# Patient Record
Sex: FEMALE | Race: WHITE | NOT HISPANIC OR LATINO | Employment: OTHER | URBAN - METROPOLITAN AREA
[De-identification: names, ages, dates, MRNs, and addresses within clinical notes are randomized per-mention and may not be internally consistent; named-entity substitution may affect disease eponyms.]

---

## 2024-01-02 ENCOUNTER — TELEPHONE (OUTPATIENT)
Dept: ORTHOPEDIC SURGERY | Facility: CLINIC | Age: 69
End: 2024-01-02

## 2024-01-18 ENCOUNTER — OFFICE VISIT (OUTPATIENT)
Dept: ORTHOPEDIC SURGERY | Facility: CLINIC | Age: 69
End: 2024-01-18
Payer: MEDICARE

## 2024-01-18 VITALS — WEIGHT: 217 LBS | HEIGHT: 66 IN | HEART RATE: 63 BPM | BODY MASS INDEX: 34.87 KG/M2

## 2024-01-18 DIAGNOSIS — Z47.89 ORTHOPEDIC AFTERCARE: Primary | ICD-10-CM

## 2024-01-18 PROCEDURE — 99024 POSTOP FOLLOW-UP VISIT: CPT | Performed by: ORTHOPAEDIC SURGERY

## 2024-01-18 RX ORDER — HYDROCODONE BITARTRATE AND ACETAMINOPHEN 5; 325 MG/1; MG/1
1 TABLET ORAL EVERY 6 HOURS PRN
Qty: 20 TABLET | Refills: 0 | Status: SHIPPED | OUTPATIENT
Start: 2024-01-18

## 2024-01-18 NOTE — PROGRESS NOTES
Patient ID: Ibeth Farris is a 68 y.o. female.    12/6/23 IM nail left tibia  Nonweightbearing doing therapy at home  Objective:    There were no vitals taken for this visit.    Physical Examination:    Incisions of the knee and ankle are healing well there is some granulation tissue and stable eschar is over the midshaft tibia incision no sign of infection calf is soft supple range of motion of the knee and ankle  Sensory and motor exam are intact in all distributions. Dorsalis pedis and posterior tibialis pulses are palpable and capillary refill is less than two seconds to all digits.    Imaging:    left Tib-Fib X-Ray  Indication: Postop IM nail  AP and Lateral views  Findings: Maintenance of fracture reduction of the tibia with callus progression nail in position with good healing of the periprosthetic fibular shaft fracture  no bony lesion  Soft tissues normal  normal joint spaces  Hardware appropriately positioned yes      yes prior studies available for comparison.  Assessment:  Doing well after IM nail left tibia    Plan:  Can begin 50% weightbearing February 1 begin therapy with the boot and walker see me with x-ray in 6 weeks

## 2024-01-19 ENCOUNTER — PATIENT ROUNDING (BHMG ONLY) (OUTPATIENT)
Dept: ORTHOPEDIC SURGERY | Facility: CLINIC | Age: 69
End: 2024-01-19
Payer: MEDICARE

## 2024-01-19 NOTE — PROGRESS NOTES
A My-Chart message has been sent to the patient for PATIENT ROUNDING with [facility] [place]

## 2024-01-30 ENCOUNTER — TELEPHONE (OUTPATIENT)
Dept: FAMILY MEDICINE CLINIC | Facility: CLINIC | Age: 69
End: 2024-01-30
Payer: MEDICARE

## 2024-01-30 DIAGNOSIS — I10 PRIMARY HYPERTENSION: Primary | ICD-10-CM

## 2024-01-30 DIAGNOSIS — E11.9 TYPE 2 DIABETES MELLITUS WITHOUT COMPLICATION, WITHOUT LONG-TERM CURRENT USE OF INSULIN: ICD-10-CM

## 2024-01-30 NOTE — TELEPHONE ENCOUNTER
I just put orders into her chart which should be done before her upcoming follow-up appointment.  Thank you.

## 2024-02-05 ENCOUNTER — TELEPHONE (OUTPATIENT)
Dept: PHYSICAL THERAPY | Facility: CLINIC | Age: 69
End: 2024-02-05

## 2024-02-10 PROCEDURE — 87636 SARSCOV2 & INF A&B AMP PRB: CPT | Performed by: FAMILY MEDICINE

## 2024-02-28 ENCOUNTER — OFFICE VISIT (OUTPATIENT)
Dept: CARDIOLOGY | Facility: CLINIC | Age: 69
End: 2024-02-28
Payer: MEDICARE

## 2024-02-28 VITALS
DIASTOLIC BLOOD PRESSURE: 86 MMHG | BODY MASS INDEX: 33.11 KG/M2 | SYSTOLIC BLOOD PRESSURE: 126 MMHG | HEIGHT: 66 IN | HEART RATE: 85 BPM | WEIGHT: 206 LBS | OXYGEN SATURATION: 98 %

## 2024-02-28 DIAGNOSIS — E83.42 HYPOMAGNESEMIA: ICD-10-CM

## 2024-02-28 DIAGNOSIS — I48.0 PAROXYSMAL ATRIAL FIBRILLATION: Primary | ICD-10-CM

## 2024-02-28 DIAGNOSIS — R94.31 ABNORMAL EKG: ICD-10-CM

## 2024-02-28 DIAGNOSIS — E87.6 HYPOKALEMIA: ICD-10-CM

## 2024-02-28 DIAGNOSIS — J81.0 ACUTE PULMONARY EDEMA: ICD-10-CM

## 2024-02-28 DIAGNOSIS — I10 ESSENTIAL HYPERTENSION: ICD-10-CM

## 2024-02-28 DIAGNOSIS — F41.9 ANXIETY: ICD-10-CM

## 2024-02-28 DIAGNOSIS — R01.1 HEART MURMUR: ICD-10-CM

## 2024-02-28 RX ORDER — ESCITALOPRAM OXALATE 20 MG/1
20 TABLET ORAL DAILY
Qty: 90 TABLET | Refills: 1 | Status: SHIPPED | OUTPATIENT
Start: 2024-02-28

## 2024-02-28 NOTE — PROGRESS NOTES
Encounter Date:02/28/2024  Last seen 8/23/2023      Patient ID: Ibeth Kiser is a 68 y.o. female.      Chief Complaint:     History of atrial fibrillation flutter  Status post ablation  Hypertension  History of premature ventricle contractions        History of Present Illness  Since I have last seen, the patient has been without any chest discomfort ,shortness of breath, palpitations, dizziness or syncope.  Denies having any headache ,abdominal pain ,nausea, vomiting , diarrhea constipation, loss of weight or loss of appetite.  Denies having any excessive bruising ,hematuria or blood in the stool.    Review of all systems negative except as indicated.    Reviewed ROS.    Assessment and Plan         ]]]]]]]]]]]]]]]  History  =====  -history of atrial flutter fibrillation with a rapid ventricular response.  Patient is maintaining sinus rhythm.     -status post right atrial flutter ablation 09/14/2018.  Patient is maintaining sinus rhythm.     -Patient had acute pulmonary edema requiring intubation and extubation.  Cardiac catheterization 09/13/2018 revealed Normal coronary arteries except for 30% proximal LAD disease.     -history of premature ventricle contractions with nonsustained ventricular tachycardia during left ventricular angiogram.  EP study was negative for ventricular dysrhythmia.     Aggravating factors for atrial dysrhythmia likely due to thyroid hormone which she is receiving has supplements.  Patient is not hypothyroid.  Patient is receiving as a part of hormone therapy from 25 again. patient is off of more thyroid normal.     -Mild aortic valve stenosis.  Echocardiogram 8/23/2023 revealed  Mild aortic valve stenosis with gradient of 15/7 mmHg and valve area of 1.8 cm².  Left atrial enlargement.  Left ventricular size and contractility is normal with ejection fraction of 60%.     Echocardiogram 11/7/2022 revealed mild aortic valve stenosis with gradient of 24/12 mmHg and valve area of 1.7 cm².   Left atrial enlargement.  Stress Cardiolite test-8/23/2033-normal.          -hypokalemia  and hypermagnesemia -improved     -history of hypertension. -improved     -Diabetes     -History of significant hypotension due to tachycardia improved with conversion to sinus rhythm and IV fluids.       -Mild elevation of troponin.  0.09 0.1 and 0.13-probable non STEMI.  However this could be due to demand ischemia.      -Chronic depression      -Primary Biliary Cholangitis     -GERD/Gastric ulcers     -status post right elbow melanoma removal.      -Allergic to lisinopril.  And codeine  ==  Plan   =  History of atrial flutter atrial fibrillation.  Status post ablation.  Patient has converted and maintaining sinus rhythm.  EKG showed sinus rhythm without ischemic changes.  (Last visit)  EKG was not performed today.  EKG 7/17/2023-primary care office reviewed showing nonspecific ST-T wave abnormalities  EKG (stress test) 8/23/2023-sinus rhythm.  EKG was not performed today-2/28/2024     Hypertension-ell-controlled  126/86  Continue metoprolol and Cardizem CD.  Altace to 10 mg a day.     Systolic murmur.  Mild aortic valve stenosis.  (Echocardiogram 8/23/2023)-as above     History of pulmonary edema-resolved.  No further episodes.     Patient is not having any palpitations dizziness or syncope.     Anticoagulation status reviewed  patient is off Eliquis.     continue metoprolol  mg a day and continue  baby aspirin Cardizem  mg  once a day magnesium and potassium supplements Lasix 20 mg a day  Patient was asked to take extra 1/4 to 1/2 tablet of metoprolol as needed.  Continue increased dose of Altace 10 mg a day.     Medications were reviewed and updated.    Followup in the office in 6 months.     Further plan will depend on patient's progress.     Reviewed updated-2/28/2024  ]]][[[[          Diagnosis Plan   1. Paroxysmal atrial fibrillation        2. Essential hypertension        3. Hypomagnesemia        4. Heart  murmur        5. Abnormal EKG        6. Acute pulmonary edema        7. Hypokalemia        LAB RESULTS (LAST 7 DAYS)    CBC        BMP        CMP         BNP        TROPONIN        CoAg        Creatinine Clearance  CrCl cannot be calculated (Patient's most recent lab result is older than the maximum 30 days allowed.).    ABG        Radiology  No radiology results for the last day                The following portions of the patient's history were reviewed and updated as appropriate: allergies, current medications, past family history, past medical history, past social history, past surgical history, and problem list.    Review of Systems   Constitutional: Negative for malaise/fatigue.   Cardiovascular:  Negative for chest pain, leg swelling and palpitations.   Respiratory:  Negative for shortness of breath.    Skin:  Negative for rash.   Neurological:  Negative for dizziness, light-headedness and numbness.         Current Outpatient Medications:     atorvastatin (LIPITOR) 20 MG tablet, Take 1 tablet by mouth Daily., Disp: 90 tablet, Rfl: 0    dilTIAZem CD (Cardizem CD) 240 MG 24 hr capsule, Take 1 capsule by mouth Every Morning., Disp: 90 capsule, Rfl: 3    escitalopram (LEXAPRO) 20 MG tablet, TAKE 1 TABLET BY MOUTH DAILY, Disp: 90 tablet, Rfl: 1    furosemide (LASIX) 20 MG tablet, Take 1 tablet by mouth Daily. (Patient taking differently: Take 1 tablet by mouth Daily. PRN), Disp: 90 tablet, Rfl: 3    metoprolol succinate XL (TOPROL-XL) 200 MG 24 hr tablet, TAKE 1 TABLET BY MOUTH DAILY, Disp: 90 tablet, Rfl: 3    OCALIVA 5 MG tablet, Take 1 tablet by mouth Daily. 2 pm (test drug) to reduce LE's, Disp: , Rfl:     pantoprazole (Protonix) 20 MG EC tablet, Take 1 tablet by mouth Daily. Take day of surgery (Patient taking differently: Take 1 tablet by mouth 3 (Three) Times a Day. Take day of surgery), Disp: 90 tablet, Rfl: 1    Semaglutide, 1 MG/DOSE, (Ozempic, 1 MG/DOSE,) 4 MG/3ML solution pen-injector, Inject 1 mg  under the skin into the appropriate area as directed 1 (One) Time Per Week., Disp: 3 mL, Rfl: 2    ursodiol (ACTIGALL) 500 MG tablet, Take 1 tablet by mouth 3 (Three) Times a Day., Disp: , Rfl:     Accu-Chek FastClix Lancets misc, USE AS DIRECTED TO TEST BLOOD SUGAR ONCE DAILY, Disp: 102 each, Rfl: 0    Blood Glucose Monitoring Suppl (ACCU-CHEK LAKEISHA) device, Use as instructed to check blood sugar, Disp: 1 each, Rfl: 0    glucose blood (Accu-Chek Lakeisha) test strip, Use as instructed to check blood sugar once a day, Disp: 100 each, Rfl: 1    Allergies   Allergen Reactions    Codeine Anxiety    Lisinopril Hives    Kiwi Extract Swelling       Family History   Problem Relation Age of Onset    Multiple myeloma Mother     Heart disease Mother        Past Surgical History:   Procedure Laterality Date    ANKLE OPEN REDUCTION INTERNAL FIXATION Right 03/26/2020    Procedure: ANKLE OPEN REDUCTION INTERNAL FIXATION lateral malleolus with syndesmotic fixation;  Surgeon: Kaleb Contreras MD;  Location: [facility] OR;  Service: Orthopedics;  Laterality: Right;    ANKLE OPEN REDUCTION INTERNAL FIXATION Left 04/20/2023    Procedure: ANKLE OPEN REDUCTION INTERNAL FIXATION;  Surgeon: Juvenal Mcnulty MD;  Location: [facility] MAIN OR;  Service: Orthopedics;  Laterality: Left;    AV NODE ABLATION  09/14/2018    COLONOSCOPY      CYSTOSCOPY, URETEROSCOPY, RETROGRADE PYELOGRAM, STENT INSERTION Right 11/01/2021    Procedure: CYSTOSCOPY, right  URETEROSCOPY, right RETROGRADE PYELOGRAM, balloon dilation of right ureteral stricture, right ureteral stent placement STENT INSERTION;  Surgeon: Chuck Lewis MD;  Location: [facility] MAIN OR;  Service: Urology;  Laterality: Right;    ENDOSCOPY      FRACTURE SURGERY      HARDWARE REMOVAL Right 07/28/2020    Procedure: RIGHT ANKLE HARDWARE REMOVAL;  Surgeon: Kaleb Contreras MD;  Location: [facility] MAIN OR;  Service: Orthopedics;  Laterality: Right;    LIVER BIOPSY      SKIN BIOPSY      SKIN CANCER EXCISION  " 2000    melanoma - right arm - Dr. Mcdaniel    TIBIA IM NAILING/RODDING Left 12/6/2023    Procedure: TIBIA INTRAMEDULLARY NAIL/TOBIN INSERTION;  Surgeon: Luigi Alvarado MD;  Location: [facility] MAIN OR;  Service: Orthopedics;  Laterality: Left;    UPPER ENDOSCOPIC ULTRASOUND W/ FNA N/A 02/15/2022    Procedure: EUS GUIDED LIVER BX;  Surgeon: Sarina Jarquin MD;  Location: [facility] ENDOSCOPY;  Service: Gastroenterology;  Laterality: N/A;  esophagitis, hiatal hernia       Past Medical History:   Diagnosis Date    Allergic     Ankle fracture 03/2020    right    Anxiety     Atrial fibrillation     Cancer     skin can- right elbow     Congenital heart failure     Patient states she does not have this    Diabetes mellitus     GERD (gastroesophageal reflux disease)     Hot flashes     Hypertension     Injury of back     Myocardial infarct 2018    Primary biliary cirrhosis     Sleep apnea     CPAP- to bring dos       Family History   Problem Relation Age of Onset    Multiple myeloma Mother     Heart disease Mother        Social History     Socioeconomic History    Marital status:    Tobacco Use    Smoking status: Never     Passive exposure: Never    Smokeless tobacco: Never   Vaping Use    Vaping Use: Never used   Substance and Sexual Activity    Alcohol use: Not Currently     Alcohol/week: 2.0 standard drinks of alcohol     Types: 2 Glasses of wine per week     Comment: s0cial    Drug use: Never    Sexual activity: Defer         Procedures      Objective:       Physical Exam    /86   Pulse 85   Ht 167.6 cm (66\")   Wt 93.4 kg (206 lb)   SpO2 98%   BMI 33.25 kg/m²   The patient is alert, oriented and in no distress.    Vital signs as noted above.    Head and neck revealed no carotid bruits or jugular venous distension.  No thyromegaly or lymphadenopathy is present.    Lungs clear.  No wheezing.  Breath sounds are normal bilaterally.    Heart normal first and second heart sounds.  Grade 2 or 6 systolic " murmur..  No pericardial rub is present.  No gallop is present.    Abdomen soft and nontender.  No organomegaly is present.    Extremities revealed good peripheral pulses without any pedal edema.    Skin warm and dry.    Musculoskeletal system is grossly normal.    CNS grossly normal.    Reviewed and updated.

## 2024-02-29 ENCOUNTER — OFFICE VISIT (OUTPATIENT)
Dept: ORTHOPEDIC SURGERY | Facility: CLINIC | Age: 69
End: 2024-02-29
Payer: MEDICARE

## 2024-02-29 VITALS — WEIGHT: 206 LBS | BODY MASS INDEX: 33.11 KG/M2 | HEIGHT: 66 IN | HEART RATE: 60 BPM

## 2024-02-29 DIAGNOSIS — Z47.89 ORTHOPEDIC AFTERCARE: Primary | ICD-10-CM

## 2024-02-29 PROCEDURE — 99024 POSTOP FOLLOW-UP VISIT: CPT | Performed by: ORTHOPAEDIC SURGERY

## 2024-02-29 RX ORDER — MELOXICAM 7.5 MG/1
7.5 TABLET ORAL DAILY PRN
Qty: 30 TABLET | Refills: 4 | Status: SHIPPED | OUTPATIENT
Start: 2024-02-29 | End: 2024-03-04 | Stop reason: SDUPTHER

## 2024-02-29 NOTE — PROGRESS NOTES
"     Patient ID: Ibeth Kiser is a 68 y.o. female.  12/6/23 IM nail left tibia   Has been recently out of removed some soreness to the ankle    Review of Systems:        Objective:    Pulse 60   Ht 167.6 cm (66\")   Wt 93.4 kg (206 lb)   BMI 33.25 kg/m²     Physical Examination:     Left leg demonstrates healed incision she has about 20 degrees of ankle dorsiflexion 25 degrees plantarflexion full range of motion the knee mild pain at the fracture site    Imaging:   left Tib-Fib X-Ray  Indication: postop IM nail  AP and Lateral views  Findings: maintenance of fracture alignment with callus progression hardware in position  no bony lesion  Soft tissues normal  not examined joint spaces  Hardware appropriately positioned yes      yes prior studies available for comparison.    Assessment:    Healing tibia and fibula fracture    Plan:   Okay to progress to weightbearing as tolerated with regular shoewear she has a lace up ankle brace at home she declined formal therapy referral meloxicam see me with x-ray in 2 months      Procedures          Disclaimer: Part of this note may be an electronic transcription/translation of spoken language to printed text using the Dragon Dictation System  "

## 2024-03-03 DIAGNOSIS — E11.9 TYPE 2 DIABETES MELLITUS WITHOUT COMPLICATION, WITHOUT LONG-TERM CURRENT USE OF INSULIN: ICD-10-CM

## 2024-03-03 RX ORDER — SEMAGLUTIDE 1.34 MG/ML
1 INJECTION, SOLUTION SUBCUTANEOUS WEEKLY
Qty: 3 ML | Refills: 0 | Status: SHIPPED | OUTPATIENT
Start: 2024-03-03

## 2024-03-04 RX ORDER — MELOXICAM 7.5 MG/1
7.5 TABLET ORAL DAILY PRN
Qty: 30 TABLET | Refills: 4 | Status: SHIPPED | OUTPATIENT
Start: 2024-03-04

## 2024-03-08 ENCOUNTER — LAB (OUTPATIENT)
Dept: LAB | Facility: HOSPITAL | Age: 69
End: 2024-03-08
Payer: MEDICARE

## 2024-03-08 LAB
ALBUMIN SERPL-MCNC: 4 G/DL (ref 3.5–5.2)
ALBUMIN UR-MCNC: 2.6 MG/DL
ALBUMIN/GLOB SERPL: 1.6 G/DL
ALP SERPL-CCNC: 428 U/L (ref 39–117)
ALT SERPL W P-5'-P-CCNC: 53 U/L (ref 1–33)
ANION GAP SERPL CALCULATED.3IONS-SCNC: 11.3 MMOL/L (ref 5–15)
AST SERPL-CCNC: 36 U/L (ref 1–32)
BILIRUB SERPL-MCNC: 0.4 MG/DL (ref 0–1.2)
BUN SERPL-MCNC: 15 MG/DL (ref 8–23)
BUN/CREAT SERPL: 22.7 (ref 7–25)
CALCIUM SPEC-SCNC: 9.1 MG/DL (ref 8.6–10.5)
CHLORIDE SERPL-SCNC: 102 MMOL/L (ref 98–107)
CHOLEST SERPL-MCNC: 175 MG/DL (ref 0–200)
CO2 SERPL-SCNC: 27.7 MMOL/L (ref 22–29)
CREAT SERPL-MCNC: 0.66 MG/DL (ref 0.57–1)
CREAT UR-MCNC: 234.2 MG/DL
EGFRCR SERPLBLD CKD-EPI 2021: 96.9 ML/MIN/1.73
GLOBULIN UR ELPH-MCNC: 2.5 GM/DL
GLUCOSE SERPL-MCNC: 134 MG/DL (ref 65–99)
HBA1C MFR BLD: 7.1 % (ref 4.8–5.6)
HDLC SERPL-MCNC: 69 MG/DL (ref 40–60)
LDLC SERPL CALC-MCNC: 90 MG/DL (ref 0–100)
LDLC/HDLC SERPL: 1.29 {RATIO}
MICROALBUMIN/CREAT UR: 11.1 MG/G (ref 0–29)
POTASSIUM SERPL-SCNC: 3.6 MMOL/L (ref 3.5–5.2)
PROT SERPL-MCNC: 6.5 G/DL (ref 6–8.5)
SODIUM SERPL-SCNC: 141 MMOL/L (ref 136–145)
TRIGL SERPL-MCNC: 86 MG/DL (ref 0–150)
VLDLC SERPL-MCNC: 16 MG/DL (ref 5–40)

## 2024-03-08 PROCEDURE — 83036 HEMOGLOBIN GLYCOSYLATED A1C: CPT | Performed by: FAMILY MEDICINE

## 2024-03-08 PROCEDURE — 83880 ASSAY OF NATRIURETIC PEPTIDE: CPT | Performed by: FAMILY MEDICINE

## 2024-03-08 PROCEDURE — 80053 COMPREHEN METABOLIC PANEL: CPT | Performed by: FAMILY MEDICINE

## 2024-03-08 PROCEDURE — 82570 ASSAY OF URINE CREATININE: CPT | Performed by: FAMILY MEDICINE

## 2024-03-08 PROCEDURE — 80061 LIPID PANEL: CPT | Performed by: FAMILY MEDICINE

## 2024-03-08 PROCEDURE — 82043 UR ALBUMIN QUANTITATIVE: CPT | Performed by: FAMILY MEDICINE

## 2024-03-11 ENCOUNTER — OFFICE VISIT (OUTPATIENT)
Dept: FAMILY MEDICINE CLINIC | Facility: CLINIC | Age: 69
End: 2024-03-11
Payer: MEDICARE

## 2024-03-11 VITALS
TEMPERATURE: 97.5 F | SYSTOLIC BLOOD PRESSURE: 144 MMHG | OXYGEN SATURATION: 97 % | RESPIRATION RATE: 16 BRPM | HEART RATE: 87 BPM | BODY MASS INDEX: 32.78 KG/M2 | DIASTOLIC BLOOD PRESSURE: 94 MMHG | WEIGHT: 204 LBS | HEIGHT: 66 IN

## 2024-03-11 DIAGNOSIS — Z12.31 VISIT FOR SCREENING MAMMOGRAM: ICD-10-CM

## 2024-03-11 DIAGNOSIS — Z12.11 COLON CANCER SCREENING: ICD-10-CM

## 2024-03-11 DIAGNOSIS — E11.9 TYPE 2 DIABETES MELLITUS WITHOUT COMPLICATION, WITHOUT LONG-TERM CURRENT USE OF INSULIN: Primary | ICD-10-CM

## 2024-03-11 DIAGNOSIS — E78.2 MIXED HYPERLIPIDEMIA: ICD-10-CM

## 2024-03-11 DIAGNOSIS — K21.9 GASTROESOPHAGEAL REFLUX DISEASE WITHOUT ESOPHAGITIS: ICD-10-CM

## 2024-03-11 RX ORDER — PANTOPRAZOLE SODIUM 20 MG/1
20 TABLET, DELAYED RELEASE ORAL EVERY 24 HOURS
Qty: 90 TABLET | Refills: 1 | Status: SHIPPED | OUTPATIENT
Start: 2024-03-11

## 2024-03-11 RX ORDER — ATORVASTATIN CALCIUM 20 MG/1
20 TABLET, FILM COATED ORAL DAILY
Qty: 90 TABLET | Refills: 0 | Status: SHIPPED | OUTPATIENT
Start: 2024-03-11

## 2024-03-11 RX ORDER — SEMAGLUTIDE 2.68 MG/ML
2 INJECTION, SOLUTION SUBCUTANEOUS WEEKLY
Qty: 3 ML | Refills: 2 | Status: SHIPPED | OUTPATIENT
Start: 2024-03-11

## 2024-03-11 NOTE — PROGRESS NOTES
Subjective   Chief Complaint   Patient presents with    Diabetes     3 month follow up    Hyperlipidemia    Hypertension    Med Refill     Ibeth Kiser is a 68 y.o. female.     Patient Care Team:  Chelo Saavedra MD as PCP - Kwadwo Cheung MD as Consulting Physician (Cardiology)  Cornelio Ibrahim MD as Consulting Physician (Gastroenterology)    History of Present Illness  She is coming in today to follow-up on her chronic medical problems and her medications including diabetes, hyperlipidemia, anxiety and depression, and chronic liver issues.  She recently had fasting blood work done and these results are being reviewed.  Patient was started on Ozempic in 7/2023 and has done very well with that since then.  She has lost about 25 pounds.  She reports having some GI side effects to begin with, but that has resolved.  She is followed by GI for the management of her primary biliary cirrhosis.  Her next follow-up appointment is next month.  She also would like to get a refill on her PPI.  She typically gets pantoprazole from her GI doctor, but she ran out of this medication few weeks ago and her acid reflux symptoms have been getting worse especially at night.       The following portions of the patient's history were reviewed and updated as appropriate: allergies, current medications, past family history, past medical history, past social history, past surgical history, and problem list.  Past Medical History:   Diagnosis Date    Allergic     Ankle fracture 03/2020    right    Anxiety     Atrial fibrillation     Cancer     skin can- right elbow     Congenital heart failure     Patient states she does not have this    Diabetes mellitus     GERD (gastroesophageal reflux disease)     Hot flashes     Hypertension     Injury of back     Myocardial infarct 2018    Primary biliary cirrhosis     Sleep apnea     CPAP- to bring dos     Past Surgical History:   Procedure Laterality Date    ANKLE OPEN  REDUCTION INTERNAL FIXATION Right 03/26/2020    Procedure: ANKLE OPEN REDUCTION INTERNAL FIXATION lateral malleolus with syndesmotic fixation;  Surgeon: Kaleb Contreras MD;  Location: [facility] MAIN OR;  Service: Orthopedics;  Laterality: Right;    ANKLE OPEN REDUCTION INTERNAL FIXATION Left 04/20/2023    Procedure: ANKLE OPEN REDUCTION INTERNAL FIXATION;  Surgeon: Juvenal Mcnulty MD;  Location: [facility] MAIN OR;  Service: Orthopedics;  Laterality: Left;    AV NODE ABLATION  09/14/2018    COLONOSCOPY      CYSTOSCOPY, URETEROSCOPY, RETROGRADE PYELOGRAM, STENT INSERTION Right 11/01/2021    Procedure: CYSTOSCOPY, right  URETEROSCOPY, right RETROGRADE PYELOGRAM, balloon dilation of right ureteral stricture, right ureteral stent placement STENT INSERTION;  Surgeon: Chuck Lewis MD;  Location: [facility] MAIN OR;  Service: Urology;  Laterality: Right;    ENDOSCOPY      FRACTURE SURGERY      HARDWARE REMOVAL Right 07/28/2020    Procedure: RIGHT ANKLE HARDWARE REMOVAL;  Surgeon: Kaleb Contreras MD;  Location: [facility] MAIN OR;  Service: Orthopedics;  Laterality: Right;    LIVER BIOPSY      SKIN BIOPSY      SKIN CANCER EXCISION  2000    melanoma - right arm - Dr. Mcdaniel    TIBIA IM NAILING/RODDING Left 12/6/2023    Procedure: TIBIA INTRAMEDULLARY NAIL/TOBIN INSERTION;  Surgeon: Luigi Alvarado MD;  Location: [facility] MAIN OR;  Service: Orthopedics;  Laterality: Left;    UPPER ENDOSCOPIC ULTRASOUND W/ FNA N/A 02/15/2022    Procedure: EUS GUIDED LIVER BX;  Surgeon: Sarina Jarquin MD;  Location: [facility] ENDOSCOPY;  Service: Gastroenterology;  Laterality: N/A;  esophagitis, hiatal hernia     The patient has a family history of  Family History   Problem Relation Age of Onset    Multiple myeloma Mother     Heart disease Mother      Social History     Socioeconomic History    Marital status:    Tobacco Use    Smoking status: Never     Passive exposure: Never    Smokeless tobacco: Never   Vaping Use    Vaping status: Never Used  "  Substance and Sexual Activity    Alcohol use: Not Currently     Alcohol/week: 2.0 standard drinks of alcohol     Types: 2 Glasses of wine per week     Comment: s0cial    Drug use: Never    Sexual activity: Defer       Review of Systems   Constitutional:  Negative for activity change, fatigue and fever.   Respiratory:  Negative for shortness of breath and wheezing.    Cardiovascular:  Negative for chest pain, palpitations and leg swelling.   Gastrointestinal:  Positive for GERD and indigestion.   Musculoskeletal:  Negative for arthralgias and back pain.   Skin:  Negative for rash.   Neurological:  Negative for tremors and headache.     Visit Vitals  /94 (BP Location: Left arm, Patient Position: Sitting, Cuff Size: Adult)   Pulse 87   Temp 97.5 °F (36.4 °C) (Infrared)   Resp 16   Ht 167.6 cm (66\")   Wt 92.5 kg (204 lb) Comment: 199.0 in the nude at home   SpO2 97%   BMI 32.93 kg/m²              Current Outpatient Medications:     Accu-Chek FastClix Lancets misc, USE AS DIRECTED TO TEST BLOOD SUGAR ONCE DAILY, Disp: 102 each, Rfl: 0    atorvastatin (LIPITOR) 20 MG tablet, Take 1 tablet by mouth Daily., Disp: 90 tablet, Rfl: 0    Blood Glucose Monitoring Suppl (ACCU-CHEK KRYSTEN) device, Use as instructed to check blood sugar, Disp: 1 each, Rfl: 0    dilTIAZem CD (Cardizem CD) 240 MG 24 hr capsule, Take 1 capsule by mouth Every Morning., Disp: 90 capsule, Rfl: 3    escitalopram (LEXAPRO) 20 MG tablet, TAKE 1 TABLET BY MOUTH DAILY, Disp: 90 tablet, Rfl: 1    furosemide (LASIX) 20 MG tablet, Take 1 tablet by mouth Daily. (Patient taking differently: Take 1 tablet by mouth Daily. PRN), Disp: 90 tablet, Rfl: 3    glucose blood (Accu-Chek Krysten) test strip, Use as instructed to check blood sugar once a day, Disp: 100 each, Rfl: 1    meloxicam (MOBIC) 7.5 MG tablet, Take 1 tablet by mouth Daily As Needed for Moderate Pain., Disp: 30 tablet, Rfl: 4    metoprolol succinate XL (TOPROL-XL) 200 MG 24 hr tablet, TAKE 1 TABLET " BY MOUTH DAILY, Disp: 90 tablet, Rfl: 3    OCALIVA 5 MG tablet, Take 1 tablet by mouth Daily. 2 pm (test drug) to reduce LE's, Disp: , Rfl:     pantoprazole (Protonix) 20 MG EC tablet, Take 1 tablet by mouth Daily. Take day of surgery, Disp: 90 tablet, Rfl: 1    ursodiol (ACTIGALL) 500 MG tablet, Take 1 tablet by mouth 3 (Three) Times a Day., Disp: , Rfl:     Semaglutide, 2 MG/DOSE, (Ozempic, 2 MG/DOSE,) 8 MG/3ML solution pen-injector, Inject 2 mg under the skin into the appropriate area as directed 1 (One) Time Per Week., Disp: 3 mL, Rfl: 2    Objective   Physical Exam  Vitals and nursing note reviewed.   Constitutional:       General: She is not in acute distress.     Appearance: Normal appearance. She is well-developed. She is not ill-appearing.   HENT:      Head: Normocephalic and atraumatic.   Cardiovascular:      Rate and Rhythm: Normal rate and regular rhythm.      Heart sounds: Normal heart sounds. No murmur heard.     No gallop.   Pulmonary:      Effort: Pulmonary effort is normal. No respiratory distress.      Breath sounds: Normal breath sounds. No wheezing, rhonchi or rales.   Chest:      Chest wall: No tenderness.   Musculoskeletal:      Cervical back: Normal range of motion and neck supple.   Neurological:      General: No focal deficit present.      Mental Status: She is alert and oriented to person, place, and time. Mental status is at baseline.   Psychiatric:         Mood and Affect: Mood normal.         FOLLOWING LABS WERE REVIEWED TODAY:  CMP          12/6/2023    03:24 12/7/2023    00:31 3/8/2024    14:12   CMP   Glucose 132  155  134    BUN 11  14  15    Creatinine 0.68  0.69  0.66    EGFR 96.2  95.9  96.9    Sodium 140  138  141    Potassium 3.8  3.9  3.6    Chloride 101  100  102    Calcium 9.3  8.6  9.1    Total Protein   6.5    Albumin   4.0    Globulin   2.5    Total Bilirubin   0.4    Alkaline Phosphatase   428    AST (SGOT)   36    ALT (SGPT)   53    Albumin/Globulin Ratio   1.6     BUN/Creatinine Ratio 16.2  20.3  22.7    Anion Gap 12.0  11.0  11.3      Lipid Panel          7/17/2023    13:56 11/27/2023    13:31 3/8/2024    14:12   Lipid Panel   Total Cholesterol 325  215  175    Triglycerides 116  136  86    HDL Cholesterol 112  106  69    VLDL Cholesterol 19  22  16    LDL Cholesterol  194  87  90    LDL/HDL Ratio 1.69  0.77  1.29      TSH          7/17/2023    13:56   TSH   TSH 1.790      Most Recent A1C          3/8/2024    14:12   HGBA1C Most Recent   Hemoglobin A1C 7.10          Assessment & Plan   Diagnoses and all orders for this visit:    1. Type 2 diabetes mellitus without complication, without long-term current use of insulin (Primary)  -     Semaglutide, 2 MG/DOSE, (Ozempic, 2 MG/DOSE,) 8 MG/3ML solution pen-injector; Inject 2 mg under the skin into the appropriate area as directed 1 (One) Time Per Week.  Dispense: 3 mL; Refill: 2  -     Hemoglobin A1c  -     Lipid Panel  -     Comprehensive Metabolic Panel    2. Mixed hyperlipidemia  -     Lipid Panel  -     atorvastatin (LIPITOR) 20 MG tablet; Take 1 tablet by mouth Daily.  Dispense: 90 tablet; Refill: 0    3. Gastroesophageal reflux disease without esophagitis  -     pantoprazole (Protonix) 20 MG EC tablet; Take 1 tablet by mouth Daily. Take day of surgery  Dispense: 90 tablet; Refill: 1    4. Visit for screening mammogram  -     Mammo Screening Digital Tomosynthesis Bilateral With CAD; Future    5. Colon cancer screening  -     Cologuard - Stool, Per Rectum; Future      I reviewed her medical problems and her medications as well as her recent fasting blood work results.  Her diabetes looks much better than it did last year and her hemoglobin A1c has come down from 8.5 and it is now 7.1, patient has lost somewhere between 15 to 20 pounds and she feels good about weight loss.  There is still room for improvement and I will be increasing her Ozempic from 1 mg to 2 mg and she will continue to monitor blood sugar and also monitor  for side effects.  I also briefly reviewed her health maintenance and the order for the mammogram and a Cologuard was given to the patient.      Return in about 3 months (around 6/11/2024) for Next scheduled follow up.    Requested Prescriptions     Signed Prescriptions Disp Refills    Semaglutide, 2 MG/DOSE, (Ozempic, 2 MG/DOSE,) 8 MG/3ML solution pen-injector 3 mL 2     Sig: Inject 2 mg under the skin into the appropriate area as directed 1 (One) Time Per Week.    pantoprazole (Protonix) 20 MG EC tablet 90 tablet 1     Sig: Take 1 tablet by mouth Daily. Take day of surgery    atorvastatin (LIPITOR) 20 MG tablet 90 tablet 0     Sig: Take 1 tablet by mouth Daily.

## 2024-04-15 RX ORDER — DILTIAZEM HYDROCHLORIDE 240 MG/1
240 CAPSULE, COATED, EXTENDED RELEASE ORAL EVERY MORNING
Qty: 90 CAPSULE | Refills: 3 | Status: SHIPPED | OUTPATIENT
Start: 2024-04-15

## 2024-04-15 NOTE — TELEPHONE ENCOUNTER
Rx Refill Note  Requested Prescriptions     Pending Prescriptions Disp Refills    dilTIAZem CD (CARDIZEM CD) 240 MG 24 hr capsule [Pharmacy Med Name: DILTIAZEM CD 240MG CAPSULES (24 HR)] 90 capsule 3     Sig: TAKE 1 CAPSULE BY MOUTH EVERY MORNING      Last office visit with prescribing clinician: 2/28/2024   Last telemedicine visit with prescribing clinician: Visit date not found   Next office visit with prescribing clinician: 8/28/2024                         Would you like a call back once the refill request has been completed: [] Yes [] No    If the office needs to give you a call back, can they leave a voicemail: [] Yes [] No    Lisa Morgan MA  04/15/24, 11:17 EDT

## 2024-04-16 RX ORDER — METHOCARBAMOL 500 MG/1
500 TABLET, FILM COATED ORAL 3 TIMES DAILY PRN
Qty: 270 TABLET | OUTPATIENT
Start: 2024-04-16

## 2024-04-16 NOTE — TELEPHONE ENCOUNTER
Rx Refill Note  Requested Prescriptions     Pending Prescriptions Disp Refills    methocarbamol (ROBAXIN) 500 MG tablet [Pharmacy Med Name: METHOCARBAMOL 500MG TABLETS] 270 tablet      Sig: TAKE 1 TABLET BY MOUTH THREE TIMES DAILY AS NEEDED FOR MUSCLE SPASMS      Last office visit with prescribing clinician: 9/27/2023   Last telemedicine visit with prescribing clinician: Visit date not found   Next office visit with prescribing clinician: Visit date not found                         Would you like a call back once the refill request has been completed: [] Yes [] No    If the office needs to give you a call back, can they leave a voicemail: [] Yes [] No    Stephanie Rodgers MA  04/16/24, 07:19 EDT

## 2024-04-18 DIAGNOSIS — Z12.11 COLON CANCER SCREENING: Primary | ICD-10-CM

## 2024-04-22 ENCOUNTER — TELEPHONE (OUTPATIENT)
Dept: FAMILY MEDICINE CLINIC | Facility: CLINIC | Age: 69
End: 2024-04-22

## 2024-04-22 RX ORDER — SEMAGLUTIDE 1.34 MG/ML
1 INJECTION, SOLUTION SUBCUTANEOUS WEEKLY
Qty: 3 ML | Refills: 0 | Status: SHIPPED | OUTPATIENT
Start: 2024-04-22

## 2024-04-22 NOTE — TELEPHONE ENCOUNTER
"  Caller: Ibeth Vasquez \"Ibeth Farris\"    Relationship: Self    Best call back number: [phone]     What medication are you requesting: OZEMPIC     What are your current symptoms: WANTS TO LOWER THE DOSE ON THE MEDICATION BECAUSE SHE STATES AFTER SHE TAKE INJECTION THE NEXT DAY SHE CAN'T KEEP ANYTHING DOWN AND CONTINUOUSLY VOMITS       If a prescription is needed, what is your preferred pharmacy and phone number: [id number] - [place] [id number] [facility] - [phone]  - [phone] FX       "

## 2024-04-22 NOTE — TELEPHONE ENCOUNTER
Okay, please advise the patient that I sent a new prescription for lower dose of Ozempic, it will be 1 mg to be taken once a week.  Continue to monitor the symptoms and if the GI side effects do not improve we will need to further lower the dose.  Thank you.

## 2024-05-20 RX ORDER — SEMAGLUTIDE 1.34 MG/ML
1 INJECTION, SOLUTION SUBCUTANEOUS WEEKLY
Qty: 3 ML | Refills: 0 | Status: SHIPPED | OUTPATIENT
Start: 2024-05-20

## 2024-06-12 ENCOUNTER — OFFICE VISIT (OUTPATIENT)
Dept: FAMILY MEDICINE CLINIC | Facility: CLINIC | Age: 69
End: 2024-06-12
Payer: MEDICARE

## 2024-06-12 ENCOUNTER — LAB (OUTPATIENT)
Dept: FAMILY MEDICINE CLINIC | Facility: CLINIC | Age: 69
End: 2024-06-12
Payer: MEDICARE

## 2024-06-12 ENCOUNTER — TELEPHONE (OUTPATIENT)
Dept: FAMILY MEDICINE CLINIC | Facility: CLINIC | Age: 69
End: 2024-06-12

## 2024-06-12 VITALS
DIASTOLIC BLOOD PRESSURE: 70 MMHG | TEMPERATURE: 98.7 F | SYSTOLIC BLOOD PRESSURE: 126 MMHG | RESPIRATION RATE: 16 BRPM | HEART RATE: 53 BPM | OXYGEN SATURATION: 95 % | BODY MASS INDEX: 29.57 KG/M2 | HEIGHT: 66 IN | WEIGHT: 184 LBS

## 2024-06-12 DIAGNOSIS — Z00.00 MEDICARE ANNUAL WELLNESS VISIT, INITIAL: Primary | ICD-10-CM

## 2024-06-12 DIAGNOSIS — E11.9 TYPE 2 DIABETES MELLITUS WITHOUT COMPLICATION, WITHOUT LONG-TERM CURRENT USE OF INSULIN: ICD-10-CM

## 2024-06-12 DIAGNOSIS — M54.50 CHRONIC BILATERAL LOW BACK PAIN WITHOUT SCIATICA: ICD-10-CM

## 2024-06-12 DIAGNOSIS — G89.29 CHRONIC BILATERAL LOW BACK PAIN WITHOUT SCIATICA: ICD-10-CM

## 2024-06-12 PROBLEM — J18.9 PNEUMONIA: Status: RESOLVED | Noted: 2023-09-27 | Resolved: 2024-06-12

## 2024-06-12 PROBLEM — J18.9 PNEUMONIA OF BOTH LOWER LOBES DUE TO INFECTIOUS ORGANISM: Status: RESOLVED | Noted: 2022-10-12 | Resolved: 2024-06-12

## 2024-06-12 LAB — HBA1C MFR BLD: 5.9 % (ref 4.8–5.6)

## 2024-06-12 PROCEDURE — 83036 HEMOGLOBIN GLYCOSYLATED A1C: CPT | Performed by: FAMILY MEDICINE

## 2024-06-12 PROCEDURE — 1126F AMNT PAIN NOTED NONE PRSNT: CPT | Performed by: FAMILY MEDICINE

## 2024-06-12 PROCEDURE — 3078F DIAST BP <80 MM HG: CPT | Performed by: FAMILY MEDICINE

## 2024-06-12 PROCEDURE — 3074F SYST BP LT 130 MM HG: CPT | Performed by: FAMILY MEDICINE

## 2024-06-12 PROCEDURE — 80061 LIPID PANEL: CPT | Performed by: FAMILY MEDICINE

## 2024-06-12 PROCEDURE — 80053 COMPREHEN METABOLIC PANEL: CPT | Performed by: FAMILY MEDICINE

## 2024-06-12 PROCEDURE — 3051F HG A1C>EQUAL 7.0%<8.0%: CPT | Performed by: FAMILY MEDICINE

## 2024-06-12 PROCEDURE — 1170F FXNL STATUS ASSESSED: CPT | Performed by: FAMILY MEDICINE

## 2024-06-12 PROCEDURE — G0438 PPPS, INITIAL VISIT: HCPCS | Performed by: FAMILY MEDICINE

## 2024-06-12 NOTE — TELEPHONE ENCOUNTER
"Caller: Ibeth Vasquez \"Ibeth Farris\"    Relationship: Self    Best call back number:[phone]     What is the best time to reach you: ANY    Who are you requesting to speak with (clinical staff, provider,  specific staff member): CLINICAL    Do you know the name of the person who called:     What was the call regarding: PATIENT WANTS TO KNOW THE NAME AND TELEPHONE NUMBER OF THE NEURSURGEON THAT SHE IS BEING REFERRED TOO.    Is it okay if the provider responds through MyChart:       "

## 2024-06-12 NOTE — PROGRESS NOTES
The ABCs of the Annual Wellness Visit  Initial Medicare Wellness Visit    Chief Complaint   Patient presents with    Medicare Wellness-Initial Visit     Subjective       Ibeth Kiser is a 68 y.o. female who presents for an Initial Medicare Wellness Visit.  She lives with her  and she is fully independent with her activities of daily living.  No issues with depression or memory problems are being reported.  She has dealt with a chronic lower back pain for some time and even previously had MRI of the lumbar spine done which showed compression fracture.  She was then evaluated by neurosurgery office and referred to physical therapy.  She reports that the pain is very bothersome to her and affects her daily functioning.  Patient was previously started on Ozempic in 7/2023 and has been on it since then.  She had significant weight loss which she is very happy about, but at the same time Ozempic has been causing GI upset and recurrent nausea and vomiting even though she has not had any vomiting in the last 4 days.  The dose of Ozempic was increased to 2 mg in 3/2024, but then due to GI side effects it was lowered to 1 mg.  She has been on 1 mg of Ozempic about 2 months, she still has nausea and vomiting.  She is inquiring about possibly switching to Mounjaro.    The following portions of the patient's history were reviewed and   updated as appropriate: allergies, current medications, past family history, past medical history, past social history, past surgical history, and problem list.     Compared to one year ago, the patient feels her physical   health is the same.    Compared to one year ago, the patient feels her mental   health is the same.    Recent Hospitalizations:  She was not admitted to the hospital during the last year.       Current Medical Providers:  Patient Care Team:  Chelo Saavedra MD as PCP - Kwadwo Cheung MD as Consulting Physician (Cardiology)  Cornelio Ibrahim MD  as Consulting Physician (Gastroenterology)    Outpatient Medications Prior to Visit   Medication Sig Dispense Refill    Accu-Chek FastClix Lancets misc USE AS DIRECTED TO TEST BLOOD SUGAR ONCE DAILY 102 each 0    atorvastatin (LIPITOR) 20 MG tablet Take 1 tablet by mouth Daily. 90 tablet 0    Blood Glucose Monitoring Suppl (ACCU-CHEK KRYSTEN) device Use as instructed to check blood sugar 1 each 0    dilTIAZem CD (CARDIZEM CD) 240 MG 24 hr capsule TAKE 1 CAPSULE BY MOUTH EVERY MORNING 90 capsule 3    escitalopram (LEXAPRO) 20 MG tablet TAKE 1 TABLET BY MOUTH DAILY 90 tablet 1    furosemide (LASIX) 20 MG tablet Take 1 tablet by mouth Daily. (Patient taking differently: Take 1 tablet by mouth Daily. PRN) 90 tablet 3    glucose blood (Accu-Chek Krysten) test strip Use as instructed to check blood sugar once a day 100 each 1    meloxicam (MOBIC) 7.5 MG tablet Take 1 tablet by mouth Daily As Needed for Moderate Pain. 30 tablet 4    metoprolol succinate XL (TOPROL-XL) 200 MG 24 hr tablet TAKE 1 TABLET BY MOUTH DAILY 90 tablet 3    OCALIVA 5 MG tablet Take 1 tablet by mouth Daily. 2 pm (test drug) to reduce LE's      pantoprazole (Protonix) 20 MG EC tablet Take 1 tablet by mouth Daily. Take day of surgery 90 tablet 1    ursodiol (ACTIGALL) 500 MG tablet Take 1 tablet by mouth 3 (Three) Times a Day.      Ozempic, 1 MG/DOSE, 4 MG/3ML solution pen-injector Inject 1 mg under the skin into the appropriate area as directed 1 (One) Time Per Week. 3 mL 0     No facility-administered medications prior to visit.       No opioid medication identified on active medication list. I have reviewed chart for other potential  high risk medication/s and harmful drug interactions in the elderly.        Aspirin is not on active medication list.  Aspirin use is not indicated based on review of current medical condition/s. Risk of harm outweighs potential benefits.  .    Patient Active Problem List   Diagnosis    Anxiety    Atrial fibrillation     Depression    Gastroesophageal reflux disease    Hypertension    Primary biliary cirrhosis    Visit for screening mammogram    Vitamin D deficiency    Trochanteric bursitis of left hip    Displaced fracture of lateral malleolus of right fibula, initial encounter for closed fracture    Welcome to Medicare preventive visit    Chronic bilateral low back pain without sciatica    Head trauma    Heart murmur    Compression fracture of T12 vertebra    DDD (degenerative disc disease), thoracic    Acute right flank pain    Rib pain on right side    Multiple closed fractures of ribs of right side    Subacute cough    Postmenopausal    Abnormal EKG    Need for vaccination    Osteopenia of lumbar spine    Compression fracture of L2 lumbar vertebra    Abnormal findings on diagnostic imaging of other parts of digestive tract    Diarrhea    Elevation of level of transaminase and lactic acid dehydrogenase (LDH)    Epigastric pain    Gastroduodenitis    Hyperlipidemia    Lesion of liver    Other diseases of stomach and duodenum    Pruritic rash    Sleep apnea    Ulcer of intestine    Hypertension    Melanoma    Type 2 diabetes mellitus without complication, without long-term current use of insulin    Fibula fracture    Displaced comminuted fracture of shaft of left tibia, initial encounter for closed fracture    Colon cancer screening    Medicare annual wellness visit, initial     Advance Care Planning   Advance Care Planning     Advance Directive is not on file.  ACP discussion was held with the patient during this visit. Patient has an advance directive (not in EMR), copy requested.    Review of Systems   Constitutional:  Negative for activity change, fatigue and fever.   Respiratory:  Negative for cough, shortness of breath and wheezing.    Cardiovascular:  Negative for chest pain, palpitations and leg swelling.   Gastrointestinal:  Positive for nausea and vomiting. Negative for constipation, diarrhea and indigestion.  "  Musculoskeletal:  Positive for arthralgias and back pain.   Skin:  Negative for color change, dry skin and rash.   Neurological:  Negative for tremors and headache.            Objective    Vitals:    06/12/24 1321   BP: 126/70   BP Location: Left arm   Patient Position: Sitting   Cuff Size: Adult   Pulse: 53   Resp: 16   Temp: 98.7 °F (37.1 °C)   TempSrc: Infrared   SpO2: 95%   Weight: 83.5 kg (184 lb)   Height: 167.6 cm (65.98\")   PainSc: 0-No pain     Estimated body mass index is 29.71 kg/m² as calculated from the following:    Height as of this encounter: 167.6 cm (65.98\").    Weight as of this encounter: 83.5 kg (184 lb).     Physical Exam  Constitutional:       General: She is not in acute distress.     Appearance: Normal appearance. She is well-developed. She is not ill-appearing or diaphoretic.      Comments: Patient is in no distress, patient has normal voice and speech.  Normal respiratory effort.   HENT:      Head: Normocephalic and atraumatic.   Pulmonary:      Effort: Pulmonary effort is normal.   Musculoskeletal:      Cervical back: Normal range of motion and neck supple.   Neurological:      General: No focal deficit present.      Mental Status: She is alert and oriented to person, place, and time. Mental status is at baseline.   Psychiatric:         Mood and Affect: Mood normal.             Does the patient have evidence of cognitive impairment?   No          HEALTH RISK ASSESSMENT    Smoking Status:  Social History     Tobacco Use   Smoking Status Never    Passive exposure: Never   Smokeless Tobacco Never     Alcohol Consumption:  Social History     Substance and Sexual Activity   Alcohol Use Not Currently    Alcohol/week: 2.0 standard drinks of alcohol    Types: 2 Glasses of wine per week    Comment: s0cial     Fall Risk Screen:    STEADI Fall Risk Assessment was completed, and patient is at LOW risk for falls.Assessment completed on:6/12/2024    Depression Screen:       3/11/2024     1:58 PM "   PHQ-2/PHQ-9 Depression Screening   Little Interest or Pleasure in Doing Things 1-->several days   Feeling Down, Depressed or Hopeless 1-->several days   Trouble Falling or Staying Asleep, or Sleeping Too Much 1-->several days   Feeling Tired or Having Little Energy 1-->several days   Poor Appetite or Overeating 0-->not at all   Feeling Bad about Yourself - or that You are a Failure or Have Let Yourself or Your Family Down 0-->not at all   Trouble Concentrating on Things, Such as Reading the Newspaper or Watching Television 0-->not at all   Moving or Speaking So Slowly that Other People Could Have Noticed? Or the Opposite - Being So Fidgety 0-->not at all   Thoughts that You Would be Better Off Dead or of Hurting Yourself in Some Way 0-->not at all   PHQ-9: Brief Depression Severity Measure Score 4   If You Checked Off Any Problems, How Difficult Have These Problems Made It For You to Do Your Work, Take Care of Things at Home, or Get Along with Other People? somewhat difficult       Health Habits and Functional and Cognitive Screenin/12/2024     1:00 PM   Functional & Cognitive Status   Do you have difficulty preparing food and eating? No   Do you have difficulty bathing yourself, getting dressed or grooming yourself? No   Do you have difficulty using the toilet? No   Do you have difficulty moving around from place to place? No   Do you have trouble with steps or getting out of a bed or a chair? No   Current Diet Diabetic Diet   Dental Exam Up to date   Eye Exam Up to date   Exercise (times per week) 0 times per week   Current Exercises Include No Regular Exercise   Do you need help using the phone?  No   Are you deaf or do you have serious difficulty hearing?  No   Do you need help to go to places out of walking distance? No   Do you need help shopping? No   Do you need help preparing meals?  No   Do you need help with housework?  No   Do you need help with laundry? No   Do you need help taking your  medications? No   Do you need help managing money? No   Do you ever drive or ride in a car without wearing a seat belt? No   Have you felt unusual stress, anger or loneliness in the last month? No   Who do you live with? Spouse   If you need help, do you have trouble finding someone available to you? No   Do you have difficulty concentrating, remembering or making decisions? No       Age-appropriate Screening Schedule:  Refer to the list below for future screening recommendations based on patient's age, sex and/or medical conditions. Orders for these recommended tests are listed in the plan section. The patient has been provided with a written plan.    Health Maintenance   Topic Date Due    ZOSTER VACCINE (1 of 2) Never done    DIABETIC FOOT EXAM  Never done    PAP SMEAR  Never done    DXA SCAN  08/25/2023    COLORECTAL CANCER SCREENING  11/29/2023    HEMOGLOBIN A1C  09/08/2024    Hepatitis B (2 of 3 - Risk 3-dose series) 03/11/2025 (Originally 8/6/2018)    RSV Vaccine - Adults (1 - 1-dose 60+ series) 03/11/2025 (Originally 10/20/2017)    TDAP/TD VACCINES (1 - Tdap) 03/11/2025 (Originally 10/20/1974)    COVID-19 Vaccine (4 - 2023-24 season) 06/11/2025 (Originally 9/1/2023)    INFLUENZA VACCINE  08/01/2024    BMI FOLLOWUP  01/19/2025    DIABETIC EYE EXAM  02/01/2025    MAMMOGRAM  02/10/2025    LIPID PANEL  03/08/2025    URINE MICROALBUMIN  03/08/2025    ANNUAL WELLNESS VISIT  06/12/2025    HEPATITIS C SCREENING  Completed    Pneumococcal Vaccine 65+  Completed          CMS Preventative Services Quick Reference  Risk Factors Identified During Encounter    Fall Risk-High or Moderate: Discussed Fall Prevention in the home  Immunizations Discussed/Encouraged: COVID19 and RSV (Respiratory Syncytial Virus)  Dental Screening Recommended  Vision Screening Recommended    The above risks/problems have been discussed with the patient.  Pertinent information has been shared with the patient in the After Visit Summary.  An After  Visit Summary and PPPS were made available to the patient.    Diagnoses and all orders for this visit:    1. Medicare annual wellness visit, initial (Primary)    2. Type 2 diabetes mellitus without complication, without long-term current use of insulin  -     Tirzepatide (Mounjaro) 2.5 MG/0.5ML solution pen-injector pen; Inject 0.5 mL under the skin into the appropriate area as directed 1 (One) Time Per Week.  Dispense: 2 mL; Refill: 0    3. Chronic bilateral low back pain without sciatica  -     Ambulatory Referral to Neurosurgery      Medicare wellness exam was done today.  I will be checking labs.  I also reviewed her health maintenance.  Her last colon cancer screening was done through negative Cologuard in 11/2020.  Patient is overdue for colon cancer screening and she is now in the process of scheduling her colonoscopy and she was encouraged to do so.  Her last mammogram was in 2/2023, order for repeat mammogram was given earlier this year and patient was encouraged to schedule it.  Her last DEXA scan was in 8/2021 and showed normal bone density.  Immunization was also reviewed and recommended.  We also addressed her diabetes, patient has been on Ozempic for almost a year now and this has helped with the control of her diabetes, but she has been experiencing GI side effects including recurrent nausea and vomiting and lowering the dose from 2 mg of Ozempic to 1 mg of Ozempic couple of months ago did not really help much even though the last 4 days seem to be better.  Patient inquired about possibly starting Mounjaro.  I discussed with the patient that Mounjaro and Ozempic works similarly and it possibly may have the same side effects even though response can vary and I will try Mounjaro.  Patient is to monitor the side effects and contact us back if any concerns.  Healthy lifestyle was reinforced.  I will be also referring her to see a neurosurgeon for further evaluation of her chronic lower back pain which did  not respond to conservative treatment up until now.    Follow Up:  Next Medicare Wellness visit to be scheduled in 1 year.        Return in about 3 months (around 9/12/2024) for Next scheduled follow up.    Requested Prescriptions     Signed Prescriptions Disp Refills    Tirzepatide (Mounjaro) 2.5 MG/0.5ML solution pen-injector pen 2 mL 0     Sig: Inject 0.5 mL under the skin into the appropriate area as directed 1 (One) Time Per Week.     Chelo Saavedra MD  06/12/2024

## 2024-06-12 NOTE — TELEPHONE ENCOUNTER
I recommended for her to see Dr. Horta.  Please send the referral to that office and provided patient with the contact information.  Thank you.

## 2024-06-13 ENCOUNTER — TELEPHONE (OUTPATIENT)
Dept: CARDIOLOGY | Facility: CLINIC | Age: 69
End: 2024-06-13
Payer: MEDICARE

## 2024-06-13 LAB
ALBUMIN SERPL-MCNC: 3.9 G/DL (ref 3.5–5.2)
ALBUMIN/GLOB SERPL: 1.5 G/DL
ALP SERPL-CCNC: 296 U/L (ref 39–117)
ALT SERPL W P-5'-P-CCNC: 29 U/L (ref 1–33)
ANION GAP SERPL CALCULATED.3IONS-SCNC: 10 MMOL/L (ref 5–15)
AST SERPL-CCNC: 38 U/L (ref 1–32)
BILIRUB SERPL-MCNC: 0.4 MG/DL (ref 0–1.2)
BUN SERPL-MCNC: 9 MG/DL (ref 8–23)
BUN/CREAT SERPL: 14.3 (ref 7–25)
CALCIUM SPEC-SCNC: 9.9 MG/DL (ref 8.6–10.5)
CHLORIDE SERPL-SCNC: 107 MMOL/L (ref 98–107)
CHOLEST SERPL-MCNC: 226 MG/DL (ref 0–200)
CO2 SERPL-SCNC: 26 MMOL/L (ref 22–29)
CREAT SERPL-MCNC: 0.63 MG/DL (ref 0.57–1)
EGFRCR SERPLBLD CKD-EPI 2021: 98 ML/MIN/1.73
GLOBULIN UR ELPH-MCNC: 2.6 GM/DL
GLUCOSE SERPL-MCNC: 126 MG/DL (ref 65–99)
HDLC SERPL-MCNC: 66 MG/DL (ref 40–60)
LDLC SERPL CALC-MCNC: 136 MG/DL (ref 0–100)
LDLC/HDLC SERPL: 2.01 {RATIO}
POTASSIUM SERPL-SCNC: 5.1 MMOL/L (ref 3.5–5.2)
PROT SERPL-MCNC: 6.5 G/DL (ref 6–8.5)
SODIUM SERPL-SCNC: 143 MMOL/L (ref 136–145)
TRIGL SERPL-MCNC: 137 MG/DL (ref 0–150)
VLDLC SERPL-MCNC: 24 MG/DL (ref 5–40)

## 2024-06-13 NOTE — TELEPHONE ENCOUNTER
Beta blocker-metoprlol and diltiazem-she thinks is too strong, hr 53, tired a lot -day and night - please advise

## 2024-06-14 NOTE — TELEPHONE ENCOUNTER
Spoke with patient - advised to decrease metoprolol to 1/2 tablet QD, call if/when she needs refills.   Understood

## 2024-06-18 NOTE — PROGRESS NOTES
"Subjective   History of Present Illness: Ibeth Kiser is a 68 y.o. female is here today for follow-up.  Patient last seen and evaluated by myself 9/27/2023 for L2 compression fracture.  She was doing well with expected clinical course and was recommended to follow-up as needed basis.  Today patient reports worsening pain along her back where her fracture was.  It extends bilaterally into her musculature and causes spasms anytime she is standing or walking for any length of time.  She reports no radicular pain into her abdomen, hips buttocks or thighs.  She reports no sensory changes.  She reports no recent fall.    History of Present Illness    The following portions of the patient's history were reviewed and updated as appropriate: allergies, current medications, past family history, past medical history, past social history, past surgical history, and problem list.    Review of Systems   Constitutional:  Positive for activity change.   HENT: Negative.     Eyes: Negative.    Respiratory: Negative.     Cardiovascular: Negative.    Gastrointestinal: Negative.    Endocrine: Negative.    Genitourinary: Negative.    Musculoskeletal:  Positive for arthralgias, back pain and myalgias.   Skin: Negative.    Allergic/Immunologic: Negative.    Neurological:         Ache with sharp shooting pains   Patient can't walk long    Hematological: Negative.    Psychiatric/Behavioral:  Positive for sleep disturbance.    All other systems reviewed and are negative.      Objective     /79   Pulse 56   Resp 18   Ht 167.6 cm (65.98\")   Wt 85.7 kg (189 lb)   SpO2 100%   BMI 30.52 kg/m²    Body mass index is 30.52 kg/m².    Vitals:    06/19/24 1031   PainSc:   8   PainLoc: Back          Physical Exam  Neurologic Exam  Tenderness to palpation midline lumbar spine  Muscle spasms bilaterally paraspinal musculature    Assessment & Plan   Independent Review of Radiographic Studies:      I personally reviewed the images from " the following studies.    Lumbar x-rays 9/26/2023    Spine is well aligned with prior L2 compression fracture as well as likely a T12 compression fracture noted.  There appears to be some DJD along her SI joints.    Medical Decision Making:      Ibeth Shin a 68 y.o. female with history of osteopenia following up today for worsening pain along her previous lumbar fracture site with accompanying musculoskeletal pain no recent new trauma reported but she does have history of osteopenia.  Fortunately she has no radicular pain or radiculopathy reported.  She was fairly tender along the area and I will send her for x-rays to look for any worsening of fracture.  I will also place her on course of muscle relaxers and tell her to continue with anti-inflammatory medication.  Any further recommendations will be made after review of imaging obtained.  Patient agrees to plan of care and wishes to proceed.  I encouraged to call the office with any questions or concerns.          Diagnoses and all orders for this visit:    1. Compression fracture of L2 vertebra with delayed healing, subsequent encounter (Primary)  -     Cancel: XR Spine Lumbar Complete With Flex & Ext; Future  -     XR Spine Lumbar Complete With Flex & Ext; Future    Other orders  -     methocarbamol (ROBAXIN) 500 MG tablet; Take 1 tablet by mouth 3 (Three) Times a Day As Needed for Muscle Spasms.  Dispense: 90 tablet; Refill: 0      Return if symptoms worsen or fail to improve.    This patient was examined wearing appropriate personal protective equipment.     Ibeth Kiser  reports that she has never smoked. She has never been exposed to tobacco smoke. She has never used smokeless tobacco.        Body mass index is 30.52 kg/m².  BMI is >= 30 and <35. (Class 1 Obesity). Recommendations for exercise counseling/recommendations and nutrition counseling/recommendations     Patient's blood pressure was reviewed.  Recommendations for  a low-salt diet  and exercise to maintain/improve BP in addition to taking any presribed medications.    Advance Care Planning   ACP discussion was held with the patient during this visit. Patient has an advance directive (not in EMR), copy requested.             CHUY Magana    06/19/24  11:01 EDT

## 2024-06-19 ENCOUNTER — OFFICE VISIT (OUTPATIENT)
Dept: NEUROSURGERY | Facility: CLINIC | Age: 69
End: 2024-06-19
Payer: MEDICARE

## 2024-06-19 VITALS
BODY MASS INDEX: 30.37 KG/M2 | HEIGHT: 66 IN | WEIGHT: 189 LBS | RESPIRATION RATE: 18 BRPM | SYSTOLIC BLOOD PRESSURE: 125 MMHG | OXYGEN SATURATION: 100 % | DIASTOLIC BLOOD PRESSURE: 79 MMHG | HEART RATE: 56 BPM

## 2024-06-19 DIAGNOSIS — S32.020G COMPRESSION FRACTURE OF L2 VERTEBRA WITH DELAYED HEALING, SUBSEQUENT ENCOUNTER: Primary | ICD-10-CM

## 2024-06-19 PROCEDURE — 1159F MED LIST DOCD IN RCRD: CPT | Performed by: NURSE PRACTITIONER

## 2024-06-19 PROCEDURE — 1160F RVW MEDS BY RX/DR IN RCRD: CPT | Performed by: NURSE PRACTITIONER

## 2024-06-19 PROCEDURE — 3078F DIAST BP <80 MM HG: CPT | Performed by: NURSE PRACTITIONER

## 2024-06-19 PROCEDURE — 3074F SYST BP LT 130 MM HG: CPT | Performed by: NURSE PRACTITIONER

## 2024-06-19 PROCEDURE — 99214 OFFICE O/P EST MOD 30 MIN: CPT | Performed by: NURSE PRACTITIONER

## 2024-06-19 RX ORDER — METHOCARBAMOL 500 MG/1
500 TABLET, FILM COATED ORAL 3 TIMES DAILY PRN
Qty: 90 TABLET | Refills: 0 | Status: SHIPPED | OUTPATIENT
Start: 2024-06-19

## 2024-07-08 DIAGNOSIS — E11.9 TYPE 2 DIABETES MELLITUS WITHOUT COMPLICATION, WITHOUT LONG-TERM CURRENT USE OF INSULIN: ICD-10-CM

## 2024-07-08 RX ORDER — TIRZEPATIDE 2.5 MG/.5ML
INJECTION, SOLUTION SUBCUTANEOUS
Qty: 2 ML | Refills: 0 | Status: SHIPPED | OUTPATIENT
Start: 2024-07-08

## 2024-07-09 DIAGNOSIS — R92.8 ABNORMAL MAMMOGRAM OF RIGHT BREAST: Primary | ICD-10-CM

## 2024-07-17 ENCOUNTER — LAB (OUTPATIENT)
Dept: FAMILY MEDICINE CLINIC | Facility: CLINIC | Age: 69
End: 2024-07-17
Payer: MEDICARE

## 2024-07-17 ENCOUNTER — OFFICE VISIT (OUTPATIENT)
Dept: FAMILY MEDICINE CLINIC | Facility: CLINIC | Age: 69
End: 2024-07-17
Payer: MEDICARE

## 2024-07-17 VITALS
RESPIRATION RATE: 16 BRPM | OXYGEN SATURATION: 98 % | BODY MASS INDEX: 29.44 KG/M2 | HEART RATE: 76 BPM | HEIGHT: 66 IN | TEMPERATURE: 98.7 F | DIASTOLIC BLOOD PRESSURE: 72 MMHG | WEIGHT: 183.2 LBS | SYSTOLIC BLOOD PRESSURE: 133 MMHG

## 2024-07-17 DIAGNOSIS — R53.83 OTHER FATIGUE: ICD-10-CM

## 2024-07-17 DIAGNOSIS — E11.9 TYPE 2 DIABETES MELLITUS WITHOUT COMPLICATION, WITHOUT LONG-TERM CURRENT USE OF INSULIN: Primary | ICD-10-CM

## 2024-07-17 DIAGNOSIS — E78.2 MIXED HYPERLIPIDEMIA: ICD-10-CM

## 2024-07-17 LAB — HOLD SPECIMEN: NORMAL

## 2024-07-17 PROCEDURE — 3078F DIAST BP <80 MM HG: CPT | Performed by: FAMILY MEDICINE

## 2024-07-17 PROCEDURE — 36415 COLL VENOUS BLD VENIPUNCTURE: CPT | Performed by: FAMILY MEDICINE

## 2024-07-17 PROCEDURE — G2211 COMPLEX E/M VISIT ADD ON: HCPCS | Performed by: FAMILY MEDICINE

## 2024-07-17 PROCEDURE — 82607 VITAMIN B-12: CPT | Performed by: FAMILY MEDICINE

## 2024-07-17 PROCEDURE — 3044F HG A1C LEVEL LT 7.0%: CPT | Performed by: FAMILY MEDICINE

## 2024-07-17 PROCEDURE — 87086 URINE CULTURE/COLONY COUNT: CPT | Performed by: FAMILY MEDICINE

## 2024-07-17 PROCEDURE — 84443 ASSAY THYROID STIM HORMONE: CPT | Performed by: FAMILY MEDICINE

## 2024-07-17 PROCEDURE — 3075F SYST BP GE 130 - 139MM HG: CPT | Performed by: FAMILY MEDICINE

## 2024-07-17 PROCEDURE — 1125F AMNT PAIN NOTED PAIN PRSNT: CPT | Performed by: FAMILY MEDICINE

## 2024-07-17 PROCEDURE — 81001 URINALYSIS AUTO W/SCOPE: CPT | Performed by: FAMILY MEDICINE

## 2024-07-17 PROCEDURE — 85025 COMPLETE CBC W/AUTO DIFF WBC: CPT | Performed by: FAMILY MEDICINE

## 2024-07-17 PROCEDURE — 99214 OFFICE O/P EST MOD 30 MIN: CPT | Performed by: FAMILY MEDICINE

## 2024-07-17 RX ORDER — ATORVASTATIN CALCIUM 20 MG/1
20 TABLET, FILM COATED ORAL DAILY
Qty: 90 TABLET | Refills: 0 | Status: SHIPPED | OUTPATIENT
Start: 2024-07-17

## 2024-07-17 NOTE — PROGRESS NOTES
Subjective   Chief Complaint   Patient presents with    Follow-up    Diabetes    Med Refill    Fatigue    Hyperlipidemia     Ibeth Kiser is a 68 y.o. female.     Patient Care Team:  Chelo Saavedra MD as PCP - Kwadwo Cheung MD as Consulting Physician (Cardiology)  Cornelio Ibrahim MD as Consulting Physician (Gastroenterology)    History of Present Illness  She is coming in today to follow-up on her medical problems and her medications including diabetes and hyperlipidemia.  Patient recently had fasting blood work done and these results are being reviewed today.  Patient was previously on Ozempic which helped control her blood sugar very well, but patient Having ongoing GI upset.  This was switched to Mounjaro 1 month ago and she reports that this medication is working for her much better and pretty much denies any GI side effects and she is ready for the dose of the medication to be increased.  Her recent blood work shows slightly elevated fasting lipid panel when compared to her previous readings.  Patient now tells me that she is not sure if she actually fasted for this blood work.  Patient also complains about some fatigue and tiredness since pretty much she retired almost a year ago.  Patient was previously diagnosed with primary biliary cirrhosis in 2013 and she is under the care of GI for the management of this condition.       The following portions of the patient's history were reviewed and updated as appropriate: allergies, current medications, past family history, past medical history, past social history, past surgical history, and problem list.  Past Medical History:   Diagnosis Date    Allergic     Ankle fracture 03/2020    right    Anxiety     Atrial fibrillation     Cancer     skin can- right elbow     Congenital heart failure     Patient states she does not have this    Diabetes mellitus     GERD (gastroesophageal reflux disease)     Hot flashes     Hypertension      Injury of back     Myocardial infarct 2018    Primary biliary cirrhosis 2013    Sleep apnea     CPAP- to bring dos     Past Surgical History:   Procedure Laterality Date    ANKLE OPEN REDUCTION INTERNAL FIXATION Right 03/26/2020    Procedure: ANKLE OPEN REDUCTION INTERNAL FIXATION lateral malleolus with syndesmotic fixation;  Surgeon: Kaleb Contreras MD;  Location: [facility] MAIN OR;  Service: Orthopedics;  Laterality: Right;    ANKLE OPEN REDUCTION INTERNAL FIXATION Left 04/20/2023    Procedure: ANKLE OPEN REDUCTION INTERNAL FIXATION;  Surgeon: Juvenal Mcnulty MD;  Location: [facility] MAIN OR;  Service: Orthopedics;  Laterality: Left;    AV NODE ABLATION  09/14/2018    COLONOSCOPY      CYSTOSCOPY, URETEROSCOPY, RETROGRADE PYELOGRAM, STENT INSERTION Right 11/01/2021    Procedure: CYSTOSCOPY, right  URETEROSCOPY, right RETROGRADE PYELOGRAM, balloon dilation of right ureteral stricture, right ureteral stent placement STENT INSERTION;  Surgeon: Chuck Lewis MD;  Location: [facility] MAIN OR;  Service: Urology;  Laterality: Right;    ENDOSCOPY      FRACTURE SURGERY      HARDWARE REMOVAL Right 07/28/2020    Procedure: RIGHT ANKLE HARDWARE REMOVAL;  Surgeon: Kaleb Contreras MD;  Location: [facility] MAIN OR;  Service: Orthopedics;  Laterality: Right;    LIVER BIOPSY      SKIN BIOPSY      SKIN CANCER EXCISION  2000    melanoma - right arm - Dr. Mcdaniel    TIBIA IM NAILING/RODDING Left 12/6/2023    Procedure: TIBIA INTRAMEDULLARY NAIL/TOBIN INSERTION;  Surgeon: Luigi Alvarado MD;  Location: [facility] MAIN OR;  Service: Orthopedics;  Laterality: Left;    UPPER ENDOSCOPIC ULTRASOUND W/ FNA N/A 02/15/2022    Procedure: EUS GUIDED LIVER BX;  Surgeon: Sarina Jarquin MD;  Location: [facility] ENDOSCOPY;  Service: Gastroenterology;  Laterality: N/A;  esophagitis, hiatal hernia     The patient has a family history of  Family History   Problem Relation Age of Onset    Multiple myeloma Mother     Heart disease Mother      Social History  "    Socioeconomic History    Marital status:    Tobacco Use    Smoking status: Never     Passive exposure: Never    Smokeless tobacco: Never   Vaping Use    Vaping status: Never Used   Substance and Sexual Activity    Alcohol use: Not Currently     Alcohol/week: 2.0 standard drinks of alcohol     Types: 2 Glasses of wine per week     Comment: s0cial    Drug use: Never    Sexual activity: Defer       Review of Systems   Constitutional:  Positive for fatigue. Negative for activity change and fever.   Respiratory:  Negative for shortness of breath and wheezing.    Cardiovascular:  Negative for chest pain, palpitations and leg swelling.   Gastrointestinal:  Negative for nausea, vomiting and GERD.   Endocrine: Negative for polydipsia and polyphagia.   Musculoskeletal:  Negative for arthralgias and back pain.   Skin:  Negative for rash.   Neurological:  Negative for tremors and headache.     Visit Vitals  /72 (BP Location: Left arm, Patient Position: Sitting, Cuff Size: Adult)   Pulse 76   Temp 98.7 °F (37.1 °C) (Infrared)   Resp 16   Ht 167.6 cm (65.98\")   Wt 83.1 kg (183 lb 3.2 oz)   SpO2 98%   BMI 29.59 kg/m²              Current Outpatient Medications:     Accu-Chek FastClix Lancets misc, USE AS DIRECTED TO TEST BLOOD SUGAR ONCE DAILY, Disp: 102 each, Rfl: 0    atorvastatin (LIPITOR) 20 MG tablet, Take 1 tablet by mouth Daily., Disp: 90 tablet, Rfl: 0    Blood Glucose Monitoring Suppl (ACCU-CHEK KRYSTEN) device, Use as instructed to check blood sugar, Disp: 1 each, Rfl: 0    dilTIAZem CD (CARDIZEM CD) 240 MG 24 hr capsule, TAKE 1 CAPSULE BY MOUTH EVERY MORNING, Disp: 90 capsule, Rfl: 3    escitalopram (LEXAPRO) 20 MG tablet, TAKE 1 TABLET BY MOUTH DAILY, Disp: 90 tablet, Rfl: 1    furosemide (LASIX) 20 MG tablet, Take 1 tablet by mouth Daily. (Patient taking differently: Take 1 tablet by mouth Daily. PRN), Disp: 90 tablet, Rfl: 3    glucose blood (Accu-Chek Krysten) test strip, Use as instructed to check " blood sugar once a day, Disp: 100 each, Rfl: 1    meloxicam (MOBIC) 7.5 MG tablet, Take 1 tablet by mouth Daily As Needed for Moderate Pain., Disp: 30 tablet, Rfl: 4    methocarbamol (ROBAXIN) 500 MG tablet, Take 1 tablet by mouth 3 (Three) Times a Day As Needed for Muscle Spasms., Disp: 90 tablet, Rfl: 0    metoprolol succinate XL (TOPROL-XL) 200 MG 24 hr tablet, TAKE 1 TABLET BY MOUTH DAILY, Disp: 90 tablet, Rfl: 3    OCALIVA 5 MG tablet, Take 1 tablet by mouth Daily. 2 pm (test drug) to reduce LE's, Disp: , Rfl:     pantoprazole (Protonix) 20 MG EC tablet, Take 1 tablet by mouth Daily. Take day of surgery, Disp: 90 tablet, Rfl: 1    ursodiol (ACTIGALL) 500 MG tablet, Take 1 tablet by mouth 3 (Three) Times a Day., Disp: , Rfl:     Tirzepatide (Mounjaro) 5 MG/0.5ML solution pen-injector pen, Inject 0.5 mL under the skin into the appropriate area as directed 1 (One) Time Per Week., Disp: 2 mL, Rfl: 0    Objective   Physical Exam  Constitutional:       General: She is not in acute distress.     Appearance: Normal appearance. She is well-developed. She is not ill-appearing or diaphoretic.      Comments: Patient is in no distress, patient has normal voice and speech.  Normal respiratory effort.   HENT:      Head: Normocephalic and atraumatic.   Pulmonary:      Effort: Pulmonary effort is normal.   Musculoskeletal:      Cervical back: Normal range of motion and neck supple.   Neurological:      General: No focal deficit present.      Mental Status: She is alert and oriented to person, place, and time. Mental status is at baseline.   Psychiatric:         Mood and Affect: Mood normal.       FOLLOWING LABS WERE REVIEWED TODAY:  CMP          12/7/2023    00:31 3/8/2024    14:12 6/12/2024    13:58   CMP   Glucose 155  134  126    BUN 14  15  9    Creatinine 0.69  0.66  0.63    EGFR 95.9  96.9  98.0    Sodium 138  141  143    Potassium 3.9  3.6  5.1    Chloride 100  102  107    Calcium 8.6  9.1  9.9    Total Protein  6.5  6.5     Albumin  4.0  3.9    Globulin  2.5  2.6    Total Bilirubin  0.4  0.4    Alkaline Phosphatase  428  296    AST (SGOT)  36  38    ALT (SGPT)  53  29    Albumin/Globulin Ratio  1.6  1.5    BUN/Creatinine Ratio 20.3  22.7  14.3    Anion Gap 11.0  11.3  10.0      Lipid Panel          11/27/2023    13:31 3/8/2024    14:12 6/12/2024    13:58   Lipid Panel   Total Cholesterol 215  175  226    Triglycerides 136  86  137    HDL Cholesterol 106  69  66    VLDL Cholesterol 22  16  24    LDL Cholesterol  87  90  136    LDL/HDL Ratio 0.77  1.29  2.01      Most Recent A1C          6/12/2024    13:58   HGBA1C Most Recent   Hemoglobin A1C 5.90            Assessment & Plan   Diagnoses and all orders for this visit:    1. Type 2 diabetes mellitus without complication, without long-term current use of insulin (Primary)  -     Tirzepatide (Mounjaro) 5 MG/0.5ML solution pen-injector pen; Inject 0.5 mL under the skin into the appropriate area as directed 1 (One) Time Per Week.  Dispense: 2 mL; Refill: 0    2. Mixed hyperlipidemia  -     atorvastatin (LIPITOR) 20 MG tablet; Take 1 tablet by mouth Daily.  Dispense: 90 tablet; Refill: 0    3. Other fatigue  -     CBC Auto Differential  -     Vitamin B12  -     TSH  -     Urinalysis With Culture If Indicated - Urine, Clean Catch  -     Strunk Urine Culture Tube - Urine, Clean Catch      I reviewed her medical problems and her medications as well as her recent blood work results.  Her diabetes has been lately very well-controlled, her most recent hemoglobin A1c was 5.9 in 6/2024, at that point however we had to switch Ozempic to Mounjaro due to GI side effects to Ozempic, patient now has been doing very well with Mounjaro and I will be increasing the dose from 2.5 mg to 5 mg.  Her fasting lipid panel looks worse than it did previously, at this point I advised the patient to continue atorvastatin 20 mg and I plan to recheck her fasting labs again at her follow-up appointment in 3  months.      Return in about 3 months (around 10/17/2024) for Next scheduled follow up.    Requested Prescriptions     Signed Prescriptions Disp Refills    Tirzepatide (Mounjaro) 5 MG/0.5ML solution pen-injector pen 2 mL 0     Sig: Inject 0.5 mL under the skin into the appropriate area as directed 1 (One) Time Per Week.    atorvastatin (LIPITOR) 20 MG tablet 90 tablet 0     Sig: Take 1 tablet by mouth Daily.       Chelo Saavedra MD  07/17/2024

## 2024-07-18 LAB
BACTERIA UR QL AUTO: ABNORMAL /HPF
BASOPHILS # BLD AUTO: 0.08 10*3/MM3 (ref 0–0.2)
BASOPHILS NFR BLD AUTO: 0.9 % (ref 0–1.5)
BILIRUB UR QL STRIP: NEGATIVE
CLARITY UR: ABNORMAL
COD CRY URNS QL: PRESENT /HPF
COLOR UR: ABNORMAL
DEPRECATED RDW RBC AUTO: 43.2 FL (ref 37–54)
EOSINOPHIL # BLD AUTO: 0.13 10*3/MM3 (ref 0–0.4)
EOSINOPHIL NFR BLD AUTO: 1.4 % (ref 0.3–6.2)
ERYTHROCYTE [DISTWIDTH] IN BLOOD BY AUTOMATED COUNT: 13.3 % (ref 12.3–15.4)
GLUCOSE UR STRIP-MCNC: NEGATIVE MG/DL
HCT VFR BLD AUTO: 42.3 % (ref 34–46.6)
HGB BLD-MCNC: 13.8 G/DL (ref 12–15.9)
HGB UR QL STRIP.AUTO: NEGATIVE
HYALINE CASTS UR QL AUTO: ABNORMAL /LPF
IMM GRANULOCYTES # BLD AUTO: 0.04 10*3/MM3 (ref 0–0.05)
IMM GRANULOCYTES NFR BLD AUTO: 0.4 % (ref 0–0.5)
KETONES UR QL STRIP: ABNORMAL
LEUKOCYTE ESTERASE UR QL STRIP.AUTO: ABNORMAL
LYMPHOCYTES # BLD AUTO: 1.95 10*3/MM3 (ref 0.7–3.1)
LYMPHOCYTES NFR BLD AUTO: 21.7 % (ref 19.6–45.3)
MCH RBC QN AUTO: 29.4 PG (ref 26.6–33)
MCHC RBC AUTO-ENTMCNC: 32.6 G/DL (ref 31.5–35.7)
MCV RBC AUTO: 90 FL (ref 79–97)
MONOCYTES # BLD AUTO: 1.14 10*3/MM3 (ref 0.1–0.9)
MONOCYTES NFR BLD AUTO: 12.7 % (ref 5–12)
NEUTROPHILS NFR BLD AUTO: 5.65 10*3/MM3 (ref 1.7–7)
NEUTROPHILS NFR BLD AUTO: 62.9 % (ref 42.7–76)
NITRITE UR QL STRIP: NEGATIVE
PH UR STRIP.AUTO: 5.5 [PH] (ref 5–8)
PLATELET # BLD AUTO: 374 10*3/MM3 (ref 140–450)
PMV BLD AUTO: 13.4 FL (ref 6–12)
PROT UR QL STRIP: ABNORMAL
RBC # BLD AUTO: 4.7 10*6/MM3 (ref 3.77–5.28)
RBC # UR STRIP: ABNORMAL /HPF
REF LAB TEST METHOD: ABNORMAL
SP GR UR STRIP: 1.03 (ref 1–1.03)
SQUAMOUS #/AREA URNS HPF: ABNORMAL /HPF
TSH SERPL DL<=0.05 MIU/L-ACNC: 3.09 UIU/ML (ref 0.27–4.2)
UROBILINOGEN UR QL STRIP: ABNORMAL
VIT B12 BLD-MCNC: 211 PG/ML (ref 211–946)
WBC # UR STRIP: ABNORMAL /HPF
WBC NRBC COR # BLD AUTO: 8.99 10*3/MM3 (ref 3.4–10.8)

## 2024-07-18 NOTE — PROGRESS NOTES
Patient was notified and verbally understood. She was fasting for her labs. And she wants to know if blood was noticed in her urine because it is never visible but has had it show up on urinalysis for several years

## 2024-07-19 LAB — BACTERIA SPEC AEROBE CULT: NORMAL

## 2024-07-25 ENCOUNTER — TELEPHONE (OUTPATIENT)
Dept: FAMILY MEDICINE CLINIC | Facility: CLINIC | Age: 69
End: 2024-07-25
Payer: MEDICARE

## 2024-07-25 NOTE — TELEPHONE ENCOUNTER
Please get a copy of the diagnostic mammogram from [facility].  I am not sure if patient is even aware of the findings and recommendations.  I need to have a copy of the diagnostic mammogram.  Thank you.

## 2024-07-25 NOTE — TELEPHONE ENCOUNTER
KENDRA FROM [facility] CALLED. THEY ARE GOING TO DO A BIOPSY ON HER ON 7/312024. THEY ARE NEEDING ORDERS FOR RIGHT BREAST US GUIDED BIOPSY DUE TO ABNORMAL US & MAMMO/RIGHT BREAST MASS.

## 2024-07-26 DIAGNOSIS — R92.8 ABNORMAL MAMMOGRAM OF RIGHT BREAST: Primary | ICD-10-CM

## 2024-07-31 ENCOUNTER — LAB REQUISITION (OUTPATIENT)
Dept: LAB | Facility: HOSPITAL | Age: 69
End: 2024-07-31
Payer: MEDICARE

## 2024-07-31 DIAGNOSIS — N63.10 UNSPECIFIED LUMP IN THE RIGHT BREAST, UNSPECIFIED QUADRANT: ICD-10-CM

## 2024-07-31 PROCEDURE — 88305 TISSUE EXAM BY PATHOLOGIST: CPT | Performed by: RADIOLOGY

## 2024-08-01 LAB
LAB AP CASE REPORT: NORMAL
LAB AP DIAGNOSIS COMMENT: NORMAL
PATH REPORT.FINAL DX SPEC: NORMAL
PATH REPORT.GROSS SPEC: NORMAL

## 2024-08-02 ENCOUNTER — TELEPHONE (OUTPATIENT)
Dept: SURGERY | Facility: CLINIC | Age: 69
End: 2024-08-02
Payer: MEDICARE

## 2024-08-02 ENCOUNTER — TELEPHONE (OUTPATIENT)
Dept: FAMILY MEDICINE CLINIC | Facility: CLINIC | Age: 69
End: 2024-08-02
Payer: MEDICARE

## 2024-08-02 DIAGNOSIS — R89.7 ABNORMAL BREAST BIOPSY: Primary | ICD-10-CM

## 2024-08-02 NOTE — TELEPHONE ENCOUNTER
HUB TO RELAY    EVERARDO FROM [facility] CALLED. SHE STATED THE PATIENT HAD A BREAST BIOPSY COMPLETED AND THEY NOTED A HIGH RISK LESION. THEY ARE RECOMMENDING A SURGICAL CONSULT.

## 2024-08-02 NOTE — TELEPHONE ENCOUNTER
I called the patient to discuss her pathology results with her as well as the recommendations.  I put a referral into her chart to see a breast surgeon-Dr. Bryant.

## 2024-08-05 DIAGNOSIS — R92.2 INCONCLUSIVE MAMMOGRAM: ICD-10-CM

## 2024-08-05 DIAGNOSIS — N60.91 ATYPICAL LOBULAR HYPERPLASIA (ALH) OF RIGHT BREAST: Primary | ICD-10-CM

## 2024-08-07 ENCOUNTER — TELEPHONE (OUTPATIENT)
Dept: FAMILY MEDICINE CLINIC | Facility: CLINIC | Age: 69
End: 2024-08-07
Payer: MEDICARE

## 2024-08-07 NOTE — TELEPHONE ENCOUNTER
HUB TO RELAY    THE REFERRAL WAS JUST PUT IN ON 8/2/24, IT CAN TAKE ABOUT 2 WEEKS FOR A REFERRAL TO BE PROCESSED. THE BREAST SURGERY OFFICE IS PROCESSING HER REFERRAL AND WILL CALL HER TO SCHEDULE AN APPT.

## 2024-08-07 NOTE — TELEPHONE ENCOUNTER
"    Caller: Ibeth Vasquez \"Ibeth Farris\"    Relationship: Self    Best call back number: [phone]    WOULD LIKE A CALL ON STATUS OF HER BREAST SURGERY REFERRAL    "

## 2024-08-09 ENCOUNTER — TELEPHONE (OUTPATIENT)
Dept: CARDIOLOGY | Facility: CLINIC | Age: 69
End: 2024-08-09
Payer: MEDICARE

## 2024-08-09 RX ORDER — FUROSEMIDE 20 MG/1
20 TABLET ORAL DAILY
Qty: 90 TABLET | Refills: 3 | Status: SHIPPED | OUTPATIENT
Start: 2024-08-09

## 2024-08-09 NOTE — TELEPHONE ENCOUNTER
Incoming Refill Request      Medication requested (name and dose): FUROSEMIDE 20 MG    Pharmacy where request should be sent: [facility]    Additional details provided by patient:     Best call back number: [phone]    Does the patient have less than a 3 day supply:  [] Yes  [x] No    Izabela Hidalgo Rep  08/09/24, 13:31 EDT

## 2024-08-09 NOTE — TELEPHONE ENCOUNTER
Rx Refill Note  Requested Prescriptions     Pending Prescriptions Disp Refills    furosemide (LASIX) 20 MG tablet 90 tablet 3     Sig: Take 1 tablet by mouth Daily.      Last office visit with prescribing clinician: 2/28/2024   Last telemedicine visit with prescribing clinician: Visit date not found   Next office visit with prescribing clinician: 8/28/2024                         Would you like a call back once the refill request has been completed: [] Yes [] No    If the office needs to give you a call back, can they leave a voicemail: [] Yes [] No    Lisa Morgan MA  08/09/24, 13:32 EDT

## 2024-08-20 ENCOUNTER — APPOINTMENT (OUTPATIENT)
Dept: CT IMAGING | Facility: HOSPITAL | Age: 69
End: 2024-08-20
Payer: MEDICARE

## 2024-08-20 ENCOUNTER — HOSPITAL ENCOUNTER (EMERGENCY)
Facility: HOSPITAL | Age: 69
Discharge: HOME | End: 2024-08-20
Attending: EMERGENCY MEDICINE
Payer: MEDICARE

## 2024-08-20 VITALS
TEMPERATURE: 98.3 F | HEART RATE: 74 BPM | HEIGHT: 66 IN | OXYGEN SATURATION: 92 % | RESPIRATION RATE: 16 BRPM | WEIGHT: 189.6 LBS | DIASTOLIC BLOOD PRESSURE: 68 MMHG | SYSTOLIC BLOOD PRESSURE: 141 MMHG | BODY MASS INDEX: 30.47 KG/M2

## 2024-08-20 DIAGNOSIS — N39.0 ACUTE URINARY TRACT INFECTION: Primary | ICD-10-CM

## 2024-08-20 LAB
ANION GAP SERPL CALCULATED.3IONS-SCNC: 11.8 MMOL/L (ref 5–15)
BACTERIA UR QL AUTO: ABNORMAL /HPF
BASOPHILS # BLD AUTO: 0.06 10*3/MM3 (ref 0–0.2)
BASOPHILS NFR BLD AUTO: 0.5 % (ref 0–1.5)
BILIRUB UR QL STRIP: ABNORMAL
BUN SERPL-MCNC: 15 MG/DL (ref 8–23)
BUN/CREAT SERPL: 24.6 (ref 7–25)
CALCIUM SPEC-SCNC: 9.5 MG/DL (ref 8.6–10.5)
CHLORIDE SERPL-SCNC: 105 MMOL/L (ref 98–107)
CLARITY UR: ABNORMAL
CO2 SERPL-SCNC: 25.2 MMOL/L (ref 22–29)
COLOR UR: ABNORMAL
CREAT SERPL-MCNC: 0.61 MG/DL (ref 0.57–1)
DEPRECATED RDW RBC AUTO: 49.8 FL (ref 37–54)
EGFRCR SERPLBLD CKD-EPI 2021: 98.7 ML/MIN/1.73
EOSINOPHIL # BLD AUTO: 0.18 10*3/MM3 (ref 0–0.4)
EOSINOPHIL NFR BLD AUTO: 1.6 % (ref 0.3–6.2)
ERYTHROCYTE [DISTWIDTH] IN BLOOD BY AUTOMATED COUNT: 14.5 % (ref 12.3–15.4)
GLUCOSE SERPL-MCNC: 128 MG/DL (ref 65–99)
GLUCOSE UR STRIP-MCNC: NEGATIVE MG/DL
HCT VFR BLD AUTO: 40.9 % (ref 34–46.6)
HGB BLD-MCNC: 12.9 G/DL (ref 12–15.9)
HGB UR QL STRIP.AUTO: ABNORMAL
HOLD SPECIMEN: NORMAL
HYALINE CASTS UR QL AUTO: ABNORMAL /LPF
IMM GRANULOCYTES # BLD AUTO: 0.05 10*3/MM3 (ref 0–0.05)
IMM GRANULOCYTES NFR BLD AUTO: 0.4 % (ref 0–0.5)
KETONES UR QL STRIP: NEGATIVE
LEUKOCYTE ESTERASE UR QL STRIP.AUTO: ABNORMAL
LYMPHOCYTES # BLD AUTO: 1.67 10*3/MM3 (ref 0.7–3.1)
LYMPHOCYTES NFR BLD AUTO: 15 % (ref 19.6–45.3)
MCH RBC QN AUTO: 29.5 PG (ref 26.6–33)
MCHC RBC AUTO-ENTMCNC: 31.5 G/DL (ref 31.5–35.7)
MCV RBC AUTO: 93.4 FL (ref 79–97)
MONOCYTES # BLD AUTO: 1.05 10*3/MM3 (ref 0.1–0.9)
MONOCYTES NFR BLD AUTO: 9.4 % (ref 5–12)
NEUTROPHILS NFR BLD AUTO: 73.1 % (ref 42.7–76)
NEUTROPHILS NFR BLD AUTO: 8.16 10*3/MM3 (ref 1.7–7)
NITRITE UR QL STRIP: POSITIVE
NRBC BLD AUTO-RTO: 0 /100 WBC (ref 0–0.2)
PH UR STRIP.AUTO: 6 [PH] (ref 5–8)
PLATELET # BLD AUTO: 326 10*3/MM3 (ref 140–450)
PMV BLD AUTO: 10.8 FL (ref 6–12)
POTASSIUM SERPL-SCNC: 3.9 MMOL/L (ref 3.5–5.2)
PROT UR QL STRIP: ABNORMAL
RBC # BLD AUTO: 4.38 10*6/MM3 (ref 3.77–5.28)
RBC # UR STRIP: ABNORMAL /HPF
REF LAB TEST METHOD: ABNORMAL
SODIUM SERPL-SCNC: 142 MMOL/L (ref 136–145)
SP GR UR STRIP: 1.02 (ref 1–1.03)
SQUAMOUS #/AREA URNS HPF: ABNORMAL /HPF
UROBILINOGEN UR QL STRIP: ABNORMAL
WBC # UR STRIP: ABNORMAL /HPF
WBC NRBC COR # BLD AUTO: 11.17 10*3/MM3 (ref 3.4–10.8)
WHOLE BLOOD HOLD COAG: NORMAL

## 2024-08-20 PROCEDURE — 80048 BASIC METABOLIC PNL TOTAL CA: CPT | Performed by: NURSE PRACTITIONER

## 2024-08-20 PROCEDURE — 25810000003 SODIUM CHLORIDE 0.9 % SOLUTION: Performed by: EMERGENCY MEDICINE

## 2024-08-20 PROCEDURE — 74176 CT ABD & PELVIS W/O CONTRAST: CPT

## 2024-08-20 PROCEDURE — 87088 URINE BACTERIA CULTURE: CPT | Performed by: NURSE PRACTITIONER

## 2024-08-20 PROCEDURE — 96365 THER/PROPH/DIAG IV INF INIT: CPT

## 2024-08-20 PROCEDURE — 87086 URINE CULTURE/COLONY COUNT: CPT | Performed by: PHYSICIAN ASSISTANT

## 2024-08-20 PROCEDURE — 81001 URINALYSIS AUTO W/SCOPE: CPT | Performed by: NURSE PRACTITIONER

## 2024-08-20 PROCEDURE — 96375 TX/PRO/DX INJ NEW DRUG ADDON: CPT

## 2024-08-20 PROCEDURE — 87086 URINE CULTURE/COLONY COUNT: CPT | Performed by: NURSE PRACTITIONER

## 2024-08-20 PROCEDURE — 87186 SC STD MICRODIL/AGAR DIL: CPT | Performed by: NURSE PRACTITIONER

## 2024-08-20 PROCEDURE — 25010000002 HYDROMORPHONE 1 MG/ML SOLUTION: Performed by: EMERGENCY MEDICINE

## 2024-08-20 PROCEDURE — 25010000002 ONDANSETRON PER 1 MG: Performed by: EMERGENCY MEDICINE

## 2024-08-20 PROCEDURE — 87186 SC STD MICRODIL/AGAR DIL: CPT | Performed by: PHYSICIAN ASSISTANT

## 2024-08-20 PROCEDURE — 99284 EMERGENCY DEPT VISIT MOD MDM: CPT

## 2024-08-20 PROCEDURE — 25010000002 CEFTRIAXONE PER 250 MG: Performed by: EMERGENCY MEDICINE

## 2024-08-20 PROCEDURE — 87088 URINE BACTERIA CULTURE: CPT | Performed by: PHYSICIAN ASSISTANT

## 2024-08-20 PROCEDURE — 85025 COMPLETE CBC W/AUTO DIFF WBC: CPT | Performed by: NURSE PRACTITIONER

## 2024-08-20 RX ORDER — HYDROCODONE BITARTRATE AND ACETAMINOPHEN 5; 325 MG/1; MG/1
1 TABLET ORAL EVERY 6 HOURS PRN
Qty: 12 TABLET | Refills: 0 | Status: SHIPPED | OUTPATIENT
Start: 2024-08-20

## 2024-08-20 RX ORDER — ONDANSETRON 8 MG/1
8 TABLET, ORALLY DISINTEGRATING ORAL EVERY 8 HOURS PRN
Qty: 10 TABLET | Refills: 0 | Status: SHIPPED | OUTPATIENT
Start: 2024-08-20

## 2024-08-20 RX ORDER — ONDANSETRON 2 MG/ML
4 INJECTION INTRAMUSCULAR; INTRAVENOUS ONCE
Status: COMPLETED | OUTPATIENT
Start: 2024-08-20 | End: 2024-08-20

## 2024-08-20 RX ORDER — CEFDINIR 300 MG/1
300 CAPSULE ORAL 2 TIMES DAILY
Qty: 10 CAPSULE | Refills: 0 | Status: SHIPPED | OUTPATIENT
Start: 2024-08-20

## 2024-08-20 RX ORDER — SODIUM CHLORIDE 0.9 % (FLUSH) 0.9 %
10 SYRINGE (ML) INJECTION AS NEEDED
Status: DISCONTINUED | OUTPATIENT
Start: 2024-08-20 | End: 2024-08-20 | Stop reason: HOSPADM

## 2024-08-20 RX ADMIN — HYDROMORPHONE HYDROCHLORIDE 0.5 MG: 1 INJECTION, SOLUTION INTRAMUSCULAR; INTRAVENOUS; SUBCUTANEOUS at 18:02

## 2024-08-20 RX ADMIN — ONDANSETRON 4 MG: 2 INJECTION INTRAMUSCULAR; INTRAVENOUS at 18:01

## 2024-08-20 RX ADMIN — SODIUM CHLORIDE 500 ML: 9 INJECTION, SOLUTION INTRAVENOUS at 18:00

## 2024-08-20 RX ADMIN — CEFTRIAXONE 2000 MG: 2 INJECTION, POWDER, FOR SOLUTION INTRAMUSCULAR; INTRAVENOUS at 18:37

## 2024-08-20 NOTE — ED PROVIDER NOTES
Subjective   Provider in Triage Note  68 yof with right flank pain and dysuria that started this morning; [place] [facility] [facility] No fever.     Due to significant overcrowding in the emergency department patient was initially seen and evaluated in triage.  Provider in triage recommended patient placement in the treatment area to initiate therapy and movement to an ER bed as soon as possible.   Orders placed; medications will be deferred to main provider per protocol.        History of Present Illness  Provider in triage note information is included in history of present illness.  The patient had the onset of bilateral flank pain worse on the right than the left and dysuria starting this morning she proceeded was leaking some amounts of urine.  She reports she had subjective fever but no chills.  She reports that she had some nausea but no vomiting or diarrhea.      Review of Systems   Constitutional:  Positive for fatigue and fever. Negative for chills.   Genitourinary:  Positive for difficulty urinating, dysuria and frequency. Negative for decreased urine volume.   Hematological:  Does not bruise/bleed easily.       Past Medical History:   Diagnosis Date    Allergic     Ankle fracture 03/2020    right    Anxiety     Atrial fibrillation     Cancer     skin can- right elbow     Congenital heart failure     Patient states she does not have this    Diabetes mellitus     GERD (gastroesophageal reflux disease)     Hot flashes     Hypertension     Injury of back     Myocardial infarct 2018    Primary biliary cirrhosis 2013    Sleep apnea     CPAP- to bring dos       Allergies   Allergen Reactions    Codeine Anxiety    Lisinopril Hives    Kiwi Extract Swelling       Past Surgical History:   Procedure Laterality Date    ANKLE OPEN REDUCTION INTERNAL FIXATION Right 03/26/2020    Procedure: ANKLE OPEN REDUCTION INTERNAL FIXATION lateral malleolus with syndesmotic fixation;  Surgeon: Ben  Kaleb GOMES MD;  Location: [facility] MAIN OR;  Service: Orthopedics;  Laterality: Right;    ANKLE OPEN REDUCTION INTERNAL FIXATION Left 04/20/2023    Procedure: ANKLE OPEN REDUCTION INTERNAL FIXATION;  Surgeon: Juvenal Mcnulty MD;  Location: [facility] MAIN OR;  Service: Orthopedics;  Laterality: Left;    AV NODE ABLATION  09/14/2018    COLONOSCOPY      CYSTOSCOPY, URETEROSCOPY, RETROGRADE PYELOGRAM, STENT INSERTION Right 11/01/2021    Procedure: CYSTOSCOPY, right  URETEROSCOPY, right RETROGRADE PYELOGRAM, balloon dilation of right ureteral stricture, right ureteral stent placement STENT INSERTION;  Surgeon: Chuck Lewis MD;  Location: [facility] MAIN OR;  Service: Urology;  Laterality: Right;    ENDOSCOPY      FRACTURE SURGERY      HARDWARE REMOVAL Right 07/28/2020    Procedure: RIGHT ANKLE HARDWARE REMOVAL;  Surgeon: Kaleb Contreras MD;  Location: [facility] MAIN OR;  Service: Orthopedics;  Laterality: Right;    LIVER BIOPSY      SKIN BIOPSY      SKIN CANCER EXCISION  2000    melanoma - right arm - Dr. Mcdainel    TIBIA IM NAILING/RODDING Left 12/6/2023    Procedure: TIBIA INTRAMEDULLARY NAIL/TOBIN INSERTION;  Surgeon: Luigi Alvarado MD;  Location: [facility] MAIN OR;  Service: Orthopedics;  Laterality: Left;    UPPER ENDOSCOPIC ULTRASOUND W/ FNA N/A 02/15/2022    Procedure: EUS GUIDED LIVER BX;  Surgeon: Sarina Jarquin MD;  Location: [facility] ENDOSCOPY;  Service: Gastroenterology;  Laterality: N/A;  esophagitis, hiatal hernia       Family History   Problem Relation Age of Onset    Multiple myeloma Mother     Heart disease Mother        Social History     Socioeconomic History    Marital status:    Tobacco Use    Smoking status: Never     Passive exposure: Never    Smokeless tobacco: Never   Vaping Use    Vaping status: Never Used   Substance and Sexual Activity    Alcohol use: Not Currently     Alcohol/week: 2.0 standard drinks of alcohol     Types: 2 Glasses of wine per week     Comment: s0cial    Drug use: Never     Sexual activity: Not Currently           Objective   Physical Exam  Alert [place] Coma Scale 15   HEENT: Pupils equal and reactive to light. Conjunctivae are not injected. Normal tympanic membranes. Oropharynx and nares are normal.   Neck: Supple. Midline trachea. No JVD. No goiter.   Chest: Clear and equal breath sounds bilaterally, regular rate and rhythm without murmur or rub.   Abdomen: Positive bowel sounds, nontender, nondistended. No rebound or peritoneal signs. No CVA tenderness.   Extremities no clubbing. cyanosis or edema. Motor sensory exam is normal. The full range of motion is intact   Skin: Warm and dry, no rashes or petechia.   Lymphatic: No regional lymphadenopathy. No calf pain, swelling or Homans sign    Procedures           ED Course      Labs Reviewed   BASIC METABOLIC PANEL - Abnormal; Notable for the following components:       Result Value    Glucose 128 (*)     All other components within normal limits    Narrative:     GFR Normal >60  Chronic Kidney Disease <60  Kidney Failure <15     URINALYSIS W/ CULTURE IF INDICATED - Abnormal; Notable for the following components:    Color, UA Orange (*)     Appearance, UA Turbid (*)     Bilirubin, UA Small (1+) (*)     Blood, UA Large (3+) (*)     Protein, UA >=300 mg/dL (3+) (*)     Leuk Esterase, UA Small (1+) (*)     Nitrite, UA Positive (*)     All other components within normal limits    Narrative:     In absence of clinical symptoms, the presence of pyuria, bacteria, and/or nitrites on the urinalysis result does not correlate with infection.   CBC WITH AUTO DIFFERENTIAL - Abnormal; Notable for the following components:    WBC 11.17 (*)     Lymphocyte % 15.0 (*)     Neutrophils, Absolute 8.16 (*)     Monocytes, Absolute 1.05 (*)     All other components within normal limits   URINALYSIS, MICROSCOPIC ONLY - Abnormal; Notable for the following components:    RBC, UA Too Numerous to Count (*)     WBC, UA Too Numerous to Count (*)     Bacteria, UA 4+  (*)     Squamous Epithelial Cells, UA 7-12 (*)     All other components within normal limits   URINE CULTURE   CBC AND DIFFERENTIAL    Narrative:     The following orders were created for panel order CBC & Differential.  Procedure                               Abnormality         Status                     ---------                               -----------         ------                     CBC Auto Differential[[id number]]        Abnormal            Final result                 Please view results for these tests on the individual orders.   EXTRA TUBES    Narrative:     The following orders were created for panel order Extra Tubes.  Procedure                               Abnormality         Status                     ---------                               -----------         ------                     Gold Top - SST[[id number]]                                   Final result               Light Blue Top[[id number]]                                   Final result                 Please view results for these tests on the individual orders.   GOLD TOP - SST   LIGHT BLUE TOP     Medications   sodium chloride 0.9 % flush 10 mL (has no administration in time range)   cefTRIAXone (ROCEPHIN) 2,000 mg in sodium chloride 0.9 % 100 mL MBP (has no administration in time range)   sodium chloride 0.9 % bolus 500 mL (500 mL Intravenous New Bag 8/20/24 1800)   ondansetron (ZOFRAN) injection 4 mg (4 mg Intravenous Given 8/20/24 1801)   HYDROmorphone (DILAUDID) injection 0.5 mg (0.5 mg Intravenous Given 8/20/24 1802)     CT Abdomen Pelvis Stone Protocol    Result Date: 8/20/2024  Urinary bladder wall thickening may indicate cystitis or chronic outlet obstruction. No renal or ureteric calculi. Electronically Signed: Anthony Armstrong MD  8/20/2024 5:48 PM EDT  Workstation ID: [id number]                                          Medical Decision Making  Will be discharged with a prescription for cefdinir, ondansetron, hydrocodone.  The patient  was encouraged to take all the antibiotics follow-up close with the primary care provider the patient was stable at discharge vocalized understanding of discharge instructions warnings    Amount and/or Complexity of Data Reviewed  Labs: ordered.  Radiology: ordered.    Risk  Prescription drug management.        Final diagnoses:   Acute urinary tract infection       ED Disposition  ED Disposition       ED Disposition   Discharge    Condition   Stable    Comment   --               Chelo Saavedra MD  [address]  [address]  [place] [address] [phone]               Medication List        New Prescriptions      cefdinir 300 MG capsule  Commonly known as: OMNICEF  Take 1 capsule by mouth 2 (Two) Times a Day.     HYDROcodone-acetaminophen 5-325 MG per tablet  Commonly known as: NORCO  Take 1 tablet by mouth Every 6 (Six) Hours As Needed for Moderate Pain or Severe Pain.     ondansetron ODT 8 MG disintegrating tablet  Commonly known as: ZOFRAN-ODT  Place 1 tablet on the tongue Every 8 (Eight) Hours As Needed for Nausea or Vomiting.               Where to Get Your Medications        These medications were sent to [id number] - [place], [place] - [address]  - [phone]  - [phone]   [address] [place] [address]      Phone: [phone]   cefdinir 300 MG capsule  HYDROcodone-acetaminophen 5-325 MG per tablet  ondansetron ODT 8 MG disintegrating tablet            Chidi Corrales MD  08/20/24 5278

## 2024-08-20 NOTE — Clinical Note
[facility] EMERGENCY DEPARTMENT  [address] [place] [address]  Phone: [phone]    Ibeth Kiser was seen and treated in our emergency department on 8/20/2024.  She may return to work on 08/23/2024.         Thank you for choosing [facility].    Chidi Corrales MD

## 2024-08-20 NOTE — DISCHARGE INSTRUCTIONS
Rest, no work or exertion, next 2 days  Tylenol for fever  Medication as directed make sure to take all of the antibiotic

## 2024-08-21 ENCOUNTER — HOSPITAL ENCOUNTER (OUTPATIENT)
Dept: MRI IMAGING | Facility: HOSPITAL | Age: 69
Discharge: HOME | End: 2024-08-21
Admitting: SURGERY
Payer: MEDICARE

## 2024-08-21 DIAGNOSIS — R92.2 INCONCLUSIVE MAMMOGRAM: ICD-10-CM

## 2024-08-21 DIAGNOSIS — N60.91 ATYPICAL LOBULAR HYPERPLASIA (ALH) OF RIGHT BREAST: ICD-10-CM

## 2024-08-21 LAB
CREAT BLDA-MCNC: 0.6 MG/DL (ref 0.6–1.3)
EGFRCR SERPLBLD CKD-EPI 2021: 99.1 ML/MIN/1.73

## 2024-08-21 PROCEDURE — C8937 CAD BREAST MRI: HCPCS

## 2024-08-21 PROCEDURE — 25010000002 GADOTERIDOL PER 1 ML: Performed by: SURGERY

## 2024-08-21 PROCEDURE — C8908 MRI W/O FOL W/CONT, BREAST,: HCPCS

## 2024-08-21 PROCEDURE — 82565 ASSAY OF CREATININE: CPT

## 2024-08-21 PROCEDURE — A9579 GAD-BASE MR CONTRAST NOS,1ML: HCPCS | Performed by: SURGERY

## 2024-08-21 RX ADMIN — GADOTERIDOL 17 ML: 279.3 INJECTION, SOLUTION INTRAVENOUS at 17:25

## 2024-08-22 ENCOUNTER — PATIENT OUTREACH (OUTPATIENT)
Dept: CASE MANAGEMENT | Facility: OTHER | Age: 69
End: 2024-08-22
Payer: MEDICARE

## 2024-08-22 LAB — BACTERIA SPEC AEROBE CULT: ABNORMAL

## 2024-08-22 NOTE — PROGRESS NOTES
Patient urine culture resulted with E. coli. Susceptible to Cephalosporins (3rd gen). Patient was given Rx for cefdinir. Therapy is appropriate coverage. No further follow-up required.    Microbiology Results (last 10 days)       Procedure Component Value - Date/Time    Urine Culture - Urine, Urine, Clean Catch [[id number]]  (Abnormal)  (Susceptibility) Collected: 08/20/24 1714    Lab Status: Final result Specimen: Urine, Clean Catch Updated: 08/22/24 0917     Urine Culture 50,000 CFU/mL Escherichia coli    Narrative:      Colonization of the urinary tract without infection is common. Treatment is discouraged unless the patient is symptomatic, pregnant, or undergoing an invasive urologic procedure.    Susceptibility        Escherichia coli      SHILOH      Amoxicillin + Clavulanate <=2 ug/ml Susceptible      Ampicillin 4 ug/ml Susceptible      Ampicillin + Sulbactam <=2 ug/ml Susceptible      Cefazolin <=4 ug/ml Susceptible      Cefepime <=1 ug/ml Susceptible      Ceftazidime <=1 ug/ml Susceptible      Ceftriaxone <=1 ug/ml Susceptible      Gentamicin <=1 ug/ml Susceptible      Levofloxacin <=0.12 ug/ml Susceptible      Nitrofurantoin <=16 ug/ml Susceptible      Piperacillin + Tazobactam <=4 ug/ml Susceptible      Trimethoprim + Sulfamethoxazole <=20 ug/ml Susceptible                           Urine Culture - Urine, Urine, Clean Catch [[id number]]  (Abnormal)  (Susceptibility) Collected: 08/20/24 1428    Lab Status: Final result Specimen: Urine, Clean Catch Updated: 08/22/24 0922     Urine Culture >100,000 CFU/mL Escherichia coli    Narrative:      Colonization of the urinary tract without infection is common. Treatment is discouraged unless the patient is symptomatic, pregnant, or undergoing an invasive urologic procedure.    Susceptibility        Escherichia coli      SHILOH      Amoxicillin + Clavulanate <=2 ug/ml Susceptible      Ampicillin 4 ug/ml Susceptible      Ampicillin + Sulbactam <=2 ug/ml Susceptible       Cefazolin <=4 ug/ml Susceptible      Cefepime <=1 ug/ml Susceptible      Ceftazidime <=1 ug/ml Susceptible      Ceftriaxone <=1 ug/ml Susceptible      Gentamicin <=1 ug/ml Susceptible      Levofloxacin <=0.12 ug/ml Susceptible      Nitrofurantoin <=16 ug/ml Susceptible      Piperacillin + Tazobactam <=4 ug/ml Susceptible      Trimethoprim + Sulfamethoxazole <=20 ug/ml Susceptible                                   Cecelia Willett [facility]  8/22/2024 13:13 EDT

## 2024-08-22 NOTE — OUTREACH NOTE
"AMBULATORY CASE MANAGEMENT NOTE    Names and Relationships of Patient/Support Persons: Contact: Ibeth Vasquez \"Ibeth Sorrento\"; Relationship: Self -     Patient Outreach    Pt discharged from [facility] ED on 8/20/24, seen for acute UTI. RN-ACM outreach call made to pt. Explained role of RN-ACM and reason for call. Pt reports improvement in flank pain. Reviewed ED AVS with pt. Education provided. She reports to be taking rx's as directed. Pt states she plans to call her PCP today for follow up. Reviewed SDOH. No needs per pt, advised her to call with any. Follow up outreach prn.     Send Education  Questions/Answers      Flowsheet Row Most Recent Value   Annual Wellness Visit:  Patient Has Completed   Other Patient Education/Resources  24/7 [facility] Nurse Call Line   24/7 Nurse Call Line Education Method Verbal   Advanced Directives: --  [resources provided]          SDOH updated and reviewed with the patient during this program:  Financial Resource Strain: Low Risk  (8/22/2024)    Overall Financial Resource Strain (CARDIA)     Difficulty of Paying Living Expenses: Not very hard      --     Food Insecurity: No Food Insecurity (8/22/2024)    Hunger Vital Sign     Worried About Running Out of Food in the Last Year: Never true     Ran Out of Food in the Last Year: Never true      --     Transportation Needs: No Transportation Needs (8/22/2024)    PRAPARE - Transportation     Lack of Transportation (Medical): No     Lack of Transportation (Non-Medical): No         Sarah BOWLING  Ambulatory Case Management    8/22/2024, 13:07 EDT  "

## 2024-08-23 NOTE — PROGRESS NOTES
[facility]     Referring Provider: Chelo Saavedra MD     Chief complaint:  Abnormal breast biopsy-ALH     HPI: Ms. Ibeth Kiser is a 69yo woman, seen at the request of Chelo Saavedra MD, for abnormal breast biopsy showing ALH    I personally reviewed her records and summarized her relevant breast history/imagin2021 [facility]  There are scattered areas of fibroglandular density.  Exam stable without demonstration of new suspicious abnormality within either breast.  Impression  No mammographic evidence of malignancy.  BI-RADS 1    2/10/2023 [facility]  There are scattered areas of fibroglandular density.  There are no suspicious masses suspicious microcalcifications or architectural distortion in either breast.  Impression  No mammographic evidence of malignancy.  BI-RADS 1    2024 [facility]  There are scattered areas of fibroglandular density.  There is asymmetry seen within the upper subareolar right breast seen on the MLO view.  No additional suspicious mass suspicious medications or architectural distortion are identified.  Breast arterial calcifications are present.  A strong associated has been shown between breast arterial calcifications and cardiovascular disease.  Impression  Asymmetry with an upper subareolar right breast seen on MLO view.  Spot compression views as well as a true lateral view of possible ultrasound recommended for further evaluation.  BI-RADS 0    2024 right diagnostic mammogram right [facility]  There are scattered areas of fibroglandular density.  On today's true lateral view there is a persistent Ovitt asymmetric density approximate 2 cm above the nipple.  This is less discrete on today's focal compression view.  No architectural distortion or suspicious calcifications.  Limited  sonographic images of the right breast were obtained in transverse and longitudinal planes.  Findings  Within the right breast at the 12 o'clock position 2 cm from the nipple there is what appears to be a mildly dilated duct containing echogenic mass this echogenic mass measures approximately 5 x 3 mm in size.  This may represent an intraductal papilloma.  Debris within the duct could also be given this appearance.    Impression  1.  Mildly dilated duct within the anterior right breast with what appears to be echogenic mass within the ducts suspicious for papilloma.  Ultrasound-guided core biopsy would be recommended.  BI-RADS 4    7/31/2024 right ultrasound-guided breast biopsy at [facility]  Biopsy results demonstrate focal atypical lobular hyperplasia.  Stromal fibrosis and adenosis could cause the imaging findings.  Given the presence of high risk lesion recommend surgical consultation with consideration for excision versus observation.    8/21/2024 bilateral breast MRI at [facility]  Amount of fibroglandular tissue: Scattered fibroglandular tissue  Background parenchymal enhancement: Moderate; Asymmetric  There are is a biopsy clip in the slightly superior subareolar right  breast at the 12 o'clock position indicating the recent biopsy site. No  suspicious mass or non-mass enhancement is identified in either breast.  No axillary or internal mammary adenopathy.  At one of the costo sternal junctions of of one of the right upper ribs  there is an approximate 1.5 x 1.6 cm area of enhancement which is  asymmetric with the contralateral left side this is best seen on image  41 series 6 of the fat-saturated postcontrast images. This is incidental  and of uncertain etiology and could be area of focal costochondritis or  possibly a rib lesion, additional evaluation of this area is recommended  as well as correlation with clinical scenario.  IMPRESSION:  1. No MR signs of malignancy in either breast.  2. Focal  approximate 1.5 x 1.6 cm area of nodular enhancement within an  anterior right rib near the costochondral junction. This is nonspecific  but asymmetric with the contralateral left side. A rib lesion or focal  inflammation/costochondritis could have this appearance. Consider  further evaluation with a chest CT as well as correlation with clinical  scenario.  ASSESSMENT:  BI-RADS 2. Benign findings.        She denies any family history of breast or ovarian cancer.     Today she presents with concern regarding recent breast biopsy result of ALH. Pt states that she did fall 2 years ago on the right ribs so is wondering if that is what is being seen on the MRI.   She denies any breast lumps, pain, skin changes, or nipple discharge.  She denies any prior history of abnormal mammograms or breast biopsies.       Review of Systems - Oncology    Medications:    Current Outpatient Medications:     Accu-Chek FastClix Lancets misc, USE AS DIRECTED TO TEST BLOOD SUGAR ONCE DAILY, Disp: 102 each, Rfl: 0    amoxicillin (AMOXIL) 875 MG tablet, TAKE 1 TABLET BY MOUTH TWICE DAILY UNTIL ALL TAKEN, Disp: , Rfl:     atorvastatin (LIPITOR) 20 MG tablet, Take 1 tablet by mouth Daily., Disp: 90 tablet, Rfl: 0    Blood Glucose Monitoring Suppl (ACCU-CHEK KRYSTEN) device, Use as instructed to check blood sugar, Disp: 1 each, Rfl: 0    cefdinir (OMNICEF) 300 MG capsule, Take 1 capsule by mouth 2 (Two) Times a Day., Disp: 10 capsule, Rfl: 0    dilTIAZem CD (CARDIZEM CD) 240 MG 24 hr capsule, TAKE 1 CAPSULE BY MOUTH EVERY MORNING, Disp: 90 capsule, Rfl: 3    escitalopram (LEXAPRO) 20 MG tablet, TAKE 1 TABLET BY MOUTH DAILY, Disp: 90 tablet, Rfl: 1    furosemide (LASIX) 20 MG tablet, Take 1 tablet by mouth Daily., Disp: 90 tablet, Rfl: 3    glucose blood (Accu-Chek Krysten) test strip, Use as instructed to check blood sugar once a day, Disp: 100 each, Rfl: 1    HYDROcodone-acetaminophen (NORCO) 5-325 MG per tablet, Take 1 tablet by mouth Every 6  (Six) Hours As Needed for Moderate Pain or Severe Pain., Disp: 12 tablet, Rfl: 0    meloxicam (MOBIC) 7.5 MG tablet, Take 1 tablet by mouth Daily As Needed for Moderate Pain., Disp: 30 tablet, Rfl: 4    methocarbamol (ROBAXIN) 500 MG tablet, Take 1 tablet by mouth 3 (Three) Times a Day As Needed for Muscle Spasms., Disp: 90 tablet, Rfl: 0    metoprolol succinate XL (TOPROL-XL) 200 MG 24 hr tablet, TAKE 1 TABLET BY MOUTH EVERY DAY, Disp: 100 tablet, Rfl: 3    OCALIVA 5 MG tablet, Take 1 tablet by mouth Daily. 2 pm (test drug) to reduce LE's, Disp: , Rfl:     ondansetron ODT (ZOFRAN-ODT) 8 MG disintegrating tablet, Place 1 tablet on the tongue Every 8 (Eight) Hours As Needed for Nausea or Vomiting., Disp: 10 tablet, Rfl: 0    pantoprazole (Protonix) 20 MG EC tablet, Take 1 tablet by mouth Daily. Take day of surgery, Disp: 90 tablet, Rfl: 1    ursodiol (ACTIGALL) 500 MG tablet, Take 1 tablet by mouth 3 (Three) Times a Day., Disp: , Rfl:     apixaban (ELIQUIS) 5 MG tablet tablet, Take 1 tablet by mouth Every 12 (Twelve) Hours., Disp: 180 tablet, Rfl: 4    Mounjaro 5 MG/0.5ML solution pen-injector pen, Inject 0.5 mL under the skin into the appropriate area as directed 1 (One) Time Per Week., Disp: 2 mL, Rfl: 0    Allergies:  Allergies   Allergen Reactions    Codeine Anxiety    Lisinopril Hives    Kiwi Extract Swelling       Medical history:  Past Medical History:   Diagnosis Date    Allergic     Ankle fracture 03/2020    right    Anxiety     Atrial fibrillation     Cancer     skin can- right elbow     Congenital heart failure     Patient states she does not have this    Diabetes mellitus     GERD (gastroesophageal reflux disease)     Hot flashes     Hypertension     Injury of back     Myocardial infarct 2018    Primary biliary cirrhosis 2013    Sleep apnea     CPAP- to bring dos       Surgical History:  Past Surgical History:   Procedure Laterality Date    ANKLE OPEN REDUCTION INTERNAL FIXATION Right 03/26/2020     Procedure: ANKLE OPEN REDUCTION INTERNAL FIXATION lateral malleolus with syndesmotic fixation;  Surgeon: Kaleb Contreras MD;  Location: [facility] MAIN OR;  Service: Orthopedics;  Laterality: Right;    ANKLE OPEN REDUCTION INTERNAL FIXATION Left 04/20/2023    Procedure: ANKLE OPEN REDUCTION INTERNAL FIXATION;  Surgeon: Juvenal Mcnulty MD;  Location: [facility] MAIN OR;  Service: Orthopedics;  Laterality: Left;    AV NODE ABLATION  09/14/2018    BREAST BIOPSY Right 2024    COLONOSCOPY      CYSTOSCOPY, URETEROSCOPY, RETROGRADE PYELOGRAM, STENT INSERTION Right 11/01/2021    Procedure: CYSTOSCOPY, right  URETEROSCOPY, right RETROGRADE PYELOGRAM, balloon dilation of right ureteral stricture, right ureteral stent placement STENT INSERTION;  Surgeon: Chuck Lewis MD;  Location: [facility] MAIN OR;  Service: Urology;  Laterality: Right;    ENDOSCOPY      FRACTURE SURGERY      HARDWARE REMOVAL Right 07/28/2020    Procedure: RIGHT ANKLE HARDWARE REMOVAL;  Surgeon: Kaleb Contreras MD;  Location: [facility] MAIN OR;  Service: Orthopedics;  Laterality: Right;    LIVER BIOPSY      SKIN BIOPSY      SKIN CANCER EXCISION  2000    melanoma - right arm - Dr. Mcdaniel    TIBIA IM NAILING/RODDING Left 12/06/2023    Procedure: TIBIA INTRAMEDULLARY NAIL/TOBIN INSERTION;  Surgeon: Luigi Alvarado MD;  Location: [facility] MAIN OR;  Service: Orthopedics;  Laterality: Left;    UPPER ENDOSCOPIC ULTRASOUND W/ FNA N/A 02/15/2022    Procedure: EUS GUIDED LIVER BX;  Surgeon: Sarina Jarquin MD;  Location: [facility] ENDOSCOPY;  Service: Gastroenterology;  Laterality: N/A;  esophagitis, hiatal hernia       Family History:  Family History   Problem Relation Age of Onset    Multiple myeloma Mother     Heart disease Mother        Social History:   Social History     Socioeconomic History    Marital status:    Tobacco Use    Smoking status: Never     Passive exposure: Never    Smokeless tobacco: Never   Vaping Use    Vaping status: Never Used   Substance and  Sexual Activity    Alcohol use: Not Currently     Alcohol/week: 2.0 standard drinks of alcohol     Types: 2 Glasses of wine per week     Comment: s0cial    Drug use: Never    Sexual activity: Not Currently     Patient drinks 2 servings of caffeine per day.       GYNECOLOGIC HISTORY:   . P: 1. AB: 0.  Last menstrual period: 60  Age at menarche: 18  Age at first childbirth: 21  Lactation/How long: N/A  Age at menopause: 60  Total years of oral contraceptive use: 38 yeas  Total years of hormone replacement therapy: 1 year estrogen and Testerone when she was 62       Physical Exam  Vitals:    24 1357   BP: 152/84   Pulse: 56   SpO2: 99%     ECOG 0 - Asymptomatic  General: NAD, well appearing  Psych: a&o x 3, normal mood and affect  Eyes: EOMI, no scleral icterus  ENMT: neck supple without masses or thyromegaly, mucus membranes moist  Resp: normal effort, CTAB  CV: RRR, no murmurs, no edema   GI: soft, NT, ND  MSK: normal gait, normal ROM in bilateral shoulders  Lymph nodes: no cervical, supraclavicular or axillary lymphadenopathy  Breast: symmetric, medium, pendulous  Right: No visible abnormalities on inspection while seated, with arms raised or hands on hips. No masses, skin changes, or nipple abnormalities.  Left: No visible abnormalities on inspection while seated, with arms raised or hands on hips. No masses, skin changes, or nipple abnormalities.      Assessment:    [facility] right breast  High risk for breast cancer - 21.6%  Abnormal imaging     Discussion:  We discussed management options for individuals who are at increased risk (>20% lifetime risk):  1) High risk screening - Annual mammogram and annual breast MRI, alternating one test every 6 months, biannual clinical exam and monthly self breast exam. She meets criteria for high risk screening.  2) Chemoprevention with Tamoxifen, Raloxifene or Exemestane. These may reduce risk up to 50%. I reviewed that these particular medications are not without risks  and the risk/benefit ratio must be considered carefully. She is not interested in this currently.  3) Risk reducing surgery such as prophylactic mastectomy  which may reduce risk by 90-95%. We discussed that this is a relatively radical strategy and is generally reserved for individuals with a known genetic mutation predisposing them to an increased risk (~50% risk) of breast cancer. She does not meet criteria for risk reducing surgery.   4) I discussed the importance of exercise and weight management as part of a risk reducing strategy, since increased BMI is associated with an increased risk of breast cancer. This is especially true in her case, as I expect her risk would decrease as she lost weight.    Risk reducing agents should be recommended when 5 year risk per Rita model is at least 3% or 10 year risk by Tyrer Cuzick model is at least 5%.  Referral to medical oncology made to discuss.      Plan:  Referral to oncology for ALH  Exam in Feb  Ct chest without contrast in the near future, call with results    I have advised the patient to continue monthly breast self examination and to return to see me in months after completion of imaging.  She was also advised to notify us if she develops new breast symptoms.       CHUY Razo    I have spent 40 mins in face to face time with the patient and in chart review.    CC:  MD Keri Tsang Adriana, MD EMR Dragon/transcription disclaimer:  Dictated using Dragon dictation

## 2024-08-25 NOTE — PROGRESS NOTES
Encounter Date:08/28/2024  Last seen 2/28/2024      Patient ID: Ibeth Kiser is a 68 y.o. female.  Chief Complaint:     History of atrial fibrillation flutter  Status post ablation  Hypertension  History of premature ventricle contractions        History of Present Illness  Since I have last seen, the patient has been without any chest discomfort ,shortness of breath, palpitations, dizziness or syncope.  Denies having any headache ,abdominal pain ,nausea, vomiting , diarrhea constipation, loss of weight or loss of appetite.  Denies having any excessive bruising ,hematuria or blood in the stool.    Review of all systems negative except as indicated.    Reviewed ROS.    Assessment and Plan         ]]]]]]]]]]]]]]]  History  =====  -history of atrial flutter fibrillation with a rapid ventricular response.  Patient is maintaining sinus rhythm.     -status post right atrial flutter ablation 09/14/2018.  Patient is maintaining sinus rhythm.     -Patient had acute pulmonary edema requiring intubation and extubation.  Cardiac catheterization 09/13/2018 revealed Normal coronary arteries except for 30% proximal LAD disease.     -history of premature ventricle contractions with nonsustained ventricular tachycardia during left ventricular angiogram.  EP study was negative for ventricular dysrhythmia.     Aggravating factors for atrial dysrhythmia likely due to thyroid hormone which she is receiving has supplements.  Patient is not hypothyroid.  Patient is receiving as a part of hormone therapy from 25 again. patient is off of more thyroid normal.     -Mild aortic valve stenosis.  Echocardiogram 8/23/2023 revealed  Mild aortic valve stenosis with gradient of 15/7 mmHg and valve area of 1.8 cm².  Left atrial enlargement.  Left ventricular size and contractility is normal with ejection fraction of 60%.     Echocardiogram 11/7/2022 revealed mild aortic valve stenosis with gradient of 24/12 mmHg and valve area of 1.7 cm².   Left atrial enlargement.  Stress Cardiolite test-8/23/2033-normal.      -hypokalemia  and hypermagnesemia -improved     -history of hypertension. -improved     -Diabetes     -History of significant hypotension due to tachycardia improved with conversion to sinus rhythm and IV fluids.       -Mild elevation of troponin.  0.09 0.1 and 0.13-probable non STEMI.  However this could be due to demand ischemia.      -Chronic depression      -Primary Biliary Cholangitis     -GERD/Gastric ulcers     -status post right elbow melanoma removal.      -Allergic to lisinopril.  And codeine  ==  Plan   =  History of atrial flutter atrial fibrillation.  Status post ablation.  Patient has converted and maintaining sinus rhythm.  EKG showed sinus rhythm without ischemic changes.  (Last visit)  EKG was not performed today.  EKG 7/17/2023-primary care office reviewed showing nonspecific ST-T wave abnormalities  EKG (stress test) 8/23/2023-sinus rhythm.  EKG 8/28/2024-sinus rhythm nonspecific ST-T wave abnormalities.    Elevated [id number] score  Have discussed options with patient.  Start Eliquis 5 mg twice daily.  Observe for toxic effects.  [id number] Score: 6    Hypertension-well-controlled  126/86  Continue metoprolol and Cardizem CD.  Altace to 10 mg a day.     Systolic murmur.  Mild aortic valve stenosis.  (Echocardiogram 8/23/2023)-as above     History of pulmonary edema-resolved.  No further episodes.     Patient is not having any palpitations dizziness or syncope.     continue metoprolol  mg a day and continue  baby aspirin Cardizem  mg  once a day magnesium and potassium supplements Lasix 20 mg a day  Patient was asked to take extra 1/4 to 1/2 tablet of metoprolol as needed.  Continue increased dose of Altace 10 mg a day.  Start Eliquis 5 mg twice daily.     Medications were reviewed and updated.     Followup in the office in 6 months.     Further plan will depend on patient's progress.     Reviewed and updated  8/28/2024.  ]]][[[[               Diagnosis Plan   1. Paroxysmal atrial fibrillation        2. Essential hypertension        3. Hypomagnesemia        4. Abnormal EKG        LAB RESULTS (LAST 7 DAYS)    CBC          BMP  Results from last 7 days   Lab Units 08/21/24  1632   CREATININE mg/dL 0.60       CMP   Results from last 7 days   Lab Units 08/21/24  1632   CREATININE mg/dL 0.60         BNP        TROPONIN        CoAg        Creatinine Clearance  Estimated Creatinine Clearance: 102.8 mL/min (by C-G formula based on SCr of 0.6 mg/dL).    ABG        Radiology  No radiology results for the last day                The following portions of the patient's history were reviewed and updated as appropriate: allergies, current medications, past family history, past medical history, past social history, past surgical history, and problem list.    Review of Systems   Constitutional: Negative for malaise/fatigue.   Cardiovascular:  Positive for palpitations. Negative for chest pain, leg swelling and syncope.   Respiratory:  Negative for shortness of breath.    Skin:  Negative for rash.   Gastrointestinal:  Negative for nausea and vomiting.   Neurological:  Negative for dizziness, light-headedness and numbness.   All other systems reviewed and are negative.        Current Outpatient Medications:   •  Accu-Chek FastClix Lancets misc, USE AS DIRECTED TO TEST BLOOD SUGAR ONCE DAILY, Disp: 102 each, Rfl: 0  •  amoxicillin (AMOXIL) 875 MG tablet, TAKE 1 TABLET BY MOUTH TWICE DAILY UNTIL ALL TAKEN, Disp: , Rfl:   •  atorvastatin (LIPITOR) 20 MG tablet, Take 1 tablet by mouth Daily., Disp: 90 tablet, Rfl: 0  •  Blood Glucose Monitoring Suppl (ACCU-CHEK KRYSTEN) device, Use as instructed to check blood sugar, Disp: 1 each, Rfl: 0  •  cefdinir (OMNICEF) 300 MG capsule, Take 1 capsule by mouth 2 (Two) Times a Day., Disp: 10 capsule, Rfl: 0  •  dilTIAZem CD (CARDIZEM CD) 240 MG 24 hr capsule, TAKE 1 CAPSULE BY MOUTH EVERY MORNING, Disp: 90  capsule, Rfl: 3  •  escitalopram (LEXAPRO) 20 MG tablet, TAKE 1 TABLET BY MOUTH DAILY, Disp: 90 tablet, Rfl: 1  •  furosemide (LASIX) 20 MG tablet, Take 1 tablet by mouth Daily., Disp: 90 tablet, Rfl: 3  •  glucose blood (Accu-Chek Lakeisha) test strip, Use as instructed to check blood sugar once a day, Disp: 100 each, Rfl: 1  •  HYDROcodone-acetaminophen (NORCO) 5-325 MG per tablet, Take 1 tablet by mouth Every 6 (Six) Hours As Needed for Moderate Pain or Severe Pain., Disp: 12 tablet, Rfl: 0  •  meloxicam (MOBIC) 7.5 MG tablet, Take 1 tablet by mouth Daily As Needed for Moderate Pain., Disp: 30 tablet, Rfl: 4  •  methocarbamol (ROBAXIN) 500 MG tablet, Take 1 tablet by mouth 3 (Three) Times a Day As Needed for Muscle Spasms., Disp: 90 tablet, Rfl: 0  •  metoprolol succinate XL (TOPROL-XL) 200 MG 24 hr tablet, TAKE 1 TABLET BY MOUTH EVERY DAY, Disp: 100 tablet, Rfl: 3  •  Mounjaro 5 MG/0.5ML solution pen-injector pen, Inject 0.5 mL under the skin into the appropriate area as directed 1 (One) Time Per Week., Disp: 2 mL, Rfl: 0  •  OCALIVA 5 MG tablet, Take 1 tablet by mouth Daily. 2 pm (test drug) to reduce LE's, Disp: , Rfl:   •  ondansetron ODT (ZOFRAN-ODT) 8 MG disintegrating tablet, Place 1 tablet on the tongue Every 8 (Eight) Hours As Needed for Nausea or Vomiting., Disp: 10 tablet, Rfl: 0  •  pantoprazole (Protonix) 20 MG EC tablet, Take 1 tablet by mouth Daily. Take day of surgery, Disp: 90 tablet, Rfl: 1  •  ursodiol (ACTIGALL) 500 MG tablet, Take 1 tablet by mouth 3 (Three) Times a Day., Disp: , Rfl:     Allergies   Allergen Reactions   • Codeine Anxiety   • Lisinopril Hives   • Kiwi Extract Swelling       Family History   Problem Relation Age of Onset   • Multiple myeloma Mother    • Heart disease Mother        Past Surgical History:   Procedure Laterality Date   • ANKLE OPEN REDUCTION INTERNAL FIXATION Right 03/26/2020    Procedure: ANKLE OPEN REDUCTION INTERNAL FIXATION lateral malleolus with syndesmotic  fixation;  Surgeon: Kaleb Contreras MD;  Location: [facility] MAIN OR;  Service: Orthopedics;  Laterality: Right;   • ANKLE OPEN REDUCTION INTERNAL FIXATION Left 04/20/2023    Procedure: ANKLE OPEN REDUCTION INTERNAL FIXATION;  Surgeon: Juvenal Mcnulty MD;  Location: [facility] MAIN OR;  Service: Orthopedics;  Laterality: Left;   • AV NODE ABLATION  09/14/2018   • BREAST BIOPSY Right 2024   • COLONOSCOPY     • CYSTOSCOPY, URETEROSCOPY, RETROGRADE PYELOGRAM, STENT INSERTION Right 11/01/2021    Procedure: CYSTOSCOPY, right  URETEROSCOPY, right RETROGRADE PYELOGRAM, balloon dilation of right ureteral stricture, right ureteral stent placement STENT INSERTION;  Surgeon: Chuck Lewis MD;  Location: [facility] MAIN OR;  Service: Urology;  Laterality: Right;   • ENDOSCOPY     • FRACTURE SURGERY     • HARDWARE REMOVAL Right 07/28/2020    Procedure: RIGHT ANKLE HARDWARE REMOVAL;  Surgeon: Kaleb Contreras MD;  Location: [facility] MAIN OR;  Service: Orthopedics;  Laterality: Right;   • LIVER BIOPSY     • SKIN BIOPSY     • SKIN CANCER EXCISION  2000    melanoma - right arm - Dr. Mcdaniel   • TIBIA IM NAILING/RODDING Left 12/06/2023    Procedure: TIBIA INTRAMEDULLARY NAIL/TOBIN INSERTION;  Surgeon: Luigi Alvarado MD;  Location: [facility] MAIN OR;  Service: Orthopedics;  Laterality: Left;   • UPPER ENDOSCOPIC ULTRASOUND W/ FNA N/A 02/15/2022    Procedure: EUS GUIDED LIVER BX;  Surgeon: Sarina Jarquin MD;  Location: [facility] ENDOSCOPY;  Service: Gastroenterology;  Laterality: N/A;  esophagitis, hiatal hernia       Past Medical History:   Diagnosis Date   • Allergic    • Ankle fracture 03/2020    right   • Anxiety    • Atrial fibrillation    • Cancer     skin can- right elbow    • Congenital heart failure     Patient states she does not have this   • Diabetes mellitus    • GERD (gastroesophageal reflux disease)    • Hot flashes    • Hypertension    • Injury of back    • Myocardial infarct 2018   • Primary biliary cirrhosis 2013   • Sleep apnea  "    CPAP- to bring dos       Family History   Problem Relation Age of Onset   • Multiple myeloma Mother    • Heart disease Mother        Social History     Socioeconomic History   • Marital status:    Tobacco Use   • Smoking status: Never     Passive exposure: Never   • Smokeless tobacco: Never   Vaping Use   • Vaping status: Never Used   Substance and Sexual Activity   • Alcohol use: Not Currently     Alcohol/week: 2.0 standard drinks of alcohol     Types: 2 Glasses of wine per week     Comment: s0cial   • Drug use: Never   • Sexual activity: Not Currently           ECG 12 Lead    Date/Time: 8/28/2024 2:07 PM  Performed by: Kwadwo Thibodeaux MD    Authorized by: Kwadwo Thibodeaux MD  Comparison: compared with previous ECG   Similar to previous ECG  Comparison to previous ECG: Sinus bradycardia 58/min nonspecific ST-T wave changes normal axis normal intervals no ectopy no significant change from previous EKG.        Objective:       Physical Exam    /89 (BP Location: Right arm, Patient Position: Sitting, Cuff Size: Adult)   Pulse 58   Ht 167.6 cm (66\")   Wt 87.5 kg (193 lb)   SpO2 99% Comment: ra  BMI 31.15 kg/m²   The patient is alert, oriented and in no distress.    Vital signs as noted above.    Head and neck revealed no carotid bruits or jugular venous distension.  No thyromegaly or lymphadenopathy is present.    Lungs clear.  No wheezing.  Breath sounds are normal bilaterally.    Heart normal first and second heart sounds.  No murmur..  No pericardial rub is present.  No gallop is present.    Abdomen soft and nontender.  No organomegaly is present.    Extremities revealed good peripheral pulses without any pedal edema.    Skin warm and dry.    Musculoskeletal system is grossly normal.    CNS grossly normal.    Reviewed and updated.        "

## 2024-08-26 ENCOUNTER — TELEPHONE (OUTPATIENT)
Dept: SURGERY | Facility: CLINIC | Age: 69
End: 2024-08-26
Payer: MEDICARE

## 2024-08-26 ENCOUNTER — OFFICE VISIT (OUTPATIENT)
Dept: SURGERY | Facility: CLINIC | Age: 69
End: 2024-08-26
Payer: MEDICARE

## 2024-08-26 VITALS
HEIGHT: 66 IN | SYSTOLIC BLOOD PRESSURE: 152 MMHG | DIASTOLIC BLOOD PRESSURE: 84 MMHG | BODY MASS INDEX: 31.02 KG/M2 | OXYGEN SATURATION: 99 % | HEART RATE: 56 BPM | WEIGHT: 193 LBS

## 2024-08-26 DIAGNOSIS — Z91.89 AT HIGH RISK FOR BREAST CANCER: Primary | ICD-10-CM

## 2024-08-26 DIAGNOSIS — R92.8 ABNORMAL MRI, BREAST: ICD-10-CM

## 2024-08-26 DIAGNOSIS — E11.9 TYPE 2 DIABETES MELLITUS WITHOUT COMPLICATION, WITHOUT LONG-TERM CURRENT USE OF INSULIN: ICD-10-CM

## 2024-08-26 DIAGNOSIS — N60.91 ATYPICAL LOBULAR HYPERPLASIA (ALH) OF RIGHT BREAST: ICD-10-CM

## 2024-08-26 DIAGNOSIS — F41.9 ANXIETY: ICD-10-CM

## 2024-08-26 PROCEDURE — 1159F MED LIST DOCD IN RCRD: CPT | Performed by: NURSE PRACTITIONER

## 2024-08-26 PROCEDURE — 99215 OFFICE O/P EST HI 40 MIN: CPT | Performed by: NURSE PRACTITIONER

## 2024-08-26 PROCEDURE — 1160F RVW MEDS BY RX/DR IN RCRD: CPT | Performed by: NURSE PRACTITIONER

## 2024-08-26 PROCEDURE — 3079F DIAST BP 80-89 MM HG: CPT | Performed by: NURSE PRACTITIONER

## 2024-08-26 PROCEDURE — 3077F SYST BP >= 140 MM HG: CPT | Performed by: NURSE PRACTITIONER

## 2024-08-26 RX ORDER — ESCITALOPRAM OXALATE 20 MG/1
20 TABLET ORAL DAILY
Qty: 90 TABLET | Refills: 1 | Status: SHIPPED | OUTPATIENT
Start: 2024-08-26

## 2024-08-26 RX ORDER — AMOXICILLIN 875 MG
TABLET ORAL
COMMUNITY
Start: 2024-08-21

## 2024-08-26 RX ORDER — METOPROLOL SUCCINATE 200 MG/1
200 TABLET, EXTENDED RELEASE ORAL DAILY
Qty: 100 TABLET | Refills: 3 | Status: SHIPPED | OUTPATIENT
Start: 2024-08-26

## 2024-08-26 RX ORDER — TIRZEPATIDE 5 MG/.5ML
INJECTION, SOLUTION SUBCUTANEOUS
Qty: 2 ML | Refills: 0 | Status: SHIPPED | OUTPATIENT
Start: 2024-08-26

## 2024-08-26 NOTE — TELEPHONE ENCOUNTER
Rx Refill Note  Requested Prescriptions     Signed Prescriptions Disp Refills    metoprolol succinate XL (TOPROL-XL) 200 MG 24 hr tablet 100 tablet 3     Sig: TAKE 1 TABLET BY MOUTH EVERY DAY     Authorizing Provider: SHAYY OCHOA     Ordering User: FLORIDA CHRISTENSEN      Last office visit with prescribing clinician: 2/28/2024   Last telemedicine visit with prescribing clinician: Visit date not found   Next office visit with prescribing clinician: 8/28/2024                         Would you like a call back once the refill request has been completed: [] Yes [] No    If the office needs to give you a call back, can they leave a voicemail: [] Yes [] No    [place] Christensen MA  08/26/24, 09:09 EDT

## 2024-08-26 NOTE — TELEPHONE ENCOUNTER
Got pt scheduled at [facility] [place] on 09/11 @ 7am    Faxed CT of chest order over to fax# [phone]    Faxed report of breast MRI over as well

## 2024-08-28 ENCOUNTER — OFFICE VISIT (OUTPATIENT)
Dept: CARDIOLOGY | Facility: CLINIC | Age: 69
End: 2024-08-28
Payer: MEDICARE

## 2024-08-28 VITALS
OXYGEN SATURATION: 99 % | BODY MASS INDEX: 31.02 KG/M2 | DIASTOLIC BLOOD PRESSURE: 89 MMHG | SYSTOLIC BLOOD PRESSURE: 140 MMHG | HEART RATE: 58 BPM | WEIGHT: 193 LBS | HEIGHT: 66 IN

## 2024-08-28 DIAGNOSIS — R94.31 ABNORMAL EKG: ICD-10-CM

## 2024-08-28 DIAGNOSIS — I48.0 PAROXYSMAL ATRIAL FIBRILLATION: Primary | ICD-10-CM

## 2024-08-28 DIAGNOSIS — E83.42 HYPOMAGNESEMIA: ICD-10-CM

## 2024-08-28 DIAGNOSIS — I10 ESSENTIAL HYPERTENSION: ICD-10-CM

## 2024-08-28 PROCEDURE — 1160F RVW MEDS BY RX/DR IN RCRD: CPT | Performed by: INTERNAL MEDICINE

## 2024-08-28 PROCEDURE — 3077F SYST BP >= 140 MM HG: CPT | Performed by: INTERNAL MEDICINE

## 2024-08-28 PROCEDURE — 99214 OFFICE O/P EST MOD 30 MIN: CPT | Performed by: INTERNAL MEDICINE

## 2024-08-28 PROCEDURE — 93000 ELECTROCARDIOGRAM COMPLETE: CPT | Performed by: INTERNAL MEDICINE

## 2024-08-28 PROCEDURE — 1159F MED LIST DOCD IN RCRD: CPT | Performed by: INTERNAL MEDICINE

## 2024-08-28 PROCEDURE — 3079F DIAST BP 80-89 MM HG: CPT | Performed by: INTERNAL MEDICINE

## 2024-09-09 ENCOUNTER — LAB (OUTPATIENT)
Dept: LAB | Facility: HOSPITAL | Age: 69
End: 2024-09-09
Payer: MEDICARE

## 2024-09-09 ENCOUNTER — CONSULT (OUTPATIENT)
Dept: ONCOLOGY | Facility: CLINIC | Age: 69
End: 2024-09-09
Payer: MEDICARE

## 2024-09-09 VITALS
DIASTOLIC BLOOD PRESSURE: 100 MMHG | OXYGEN SATURATION: 96 % | WEIGHT: 194 LBS | HEIGHT: 66 IN | RESPIRATION RATE: 18 BRPM | HEART RATE: 50 BPM | BODY MASS INDEX: 31.18 KG/M2 | TEMPERATURE: 98 F | SYSTOLIC BLOOD PRESSURE: 192 MMHG

## 2024-09-09 DIAGNOSIS — N60.91 ATYPICAL LOBULAR HYPERPLASIA (ALH) OF RIGHT BREAST: Primary | ICD-10-CM

## 2024-09-09 DIAGNOSIS — N60.91 ATYPICAL LOBULAR HYPERPLASIA (ALH) OF RIGHT BREAST: ICD-10-CM

## 2024-09-09 DIAGNOSIS — K76.9 LESION OF LIVER: ICD-10-CM

## 2024-09-09 DIAGNOSIS — Z78.0 POST-MENOPAUSAL: ICD-10-CM

## 2024-09-09 LAB
ALBUMIN SERPL-MCNC: 4.2 G/DL (ref 3.5–5.2)
ALBUMIN/GLOB SERPL: 1.5 G/DL
ALP SERPL-CCNC: 449 U/L (ref 39–117)
ALT SERPL W P-5'-P-CCNC: 33 U/L (ref 1–33)
ANION GAP SERPL CALCULATED.3IONS-SCNC: 10 MMOL/L (ref 5–15)
AST SERPL-CCNC: 39 U/L (ref 1–32)
BASOPHILS # BLD AUTO: 0.06 10*3/MM3 (ref 0–0.2)
BASOPHILS NFR BLD AUTO: 0.6 % (ref 0–1.5)
BILIRUB SERPL-MCNC: 0.5 MG/DL (ref 0–1.2)
BUN SERPL-MCNC: 23 MG/DL (ref 8–23)
BUN/CREAT SERPL: 32.9 (ref 7–25)
CALCIUM SPEC-SCNC: 9.6 MG/DL (ref 8.6–10.5)
CHLORIDE SERPL-SCNC: 103 MMOL/L (ref 98–107)
CO2 SERPL-SCNC: 28 MMOL/L (ref 22–29)
CREAT SERPL-MCNC: 0.7 MG/DL (ref 0.57–1)
DEPRECATED RDW RBC AUTO: 47.6 FL (ref 37–54)
EGFRCR SERPLBLD CKD-EPI 2021: 95.5 ML/MIN/1.73
EOSINOPHIL # BLD AUTO: 0.13 10*3/MM3 (ref 0–0.4)
EOSINOPHIL NFR BLD AUTO: 1.3 % (ref 0.3–6.2)
ERYTHROCYTE [DISTWIDTH] IN BLOOD BY AUTOMATED COUNT: 14.5 % (ref 12.3–15.4)
GLOBULIN UR ELPH-MCNC: 2.8 GM/DL
GLUCOSE SERPL-MCNC: 107 MG/DL (ref 65–99)
HCT VFR BLD AUTO: 39.3 % (ref 34–46.6)
HGB BLD-MCNC: 12.8 G/DL (ref 12–15.9)
LYMPHOCYTES # BLD AUTO: 2.14 10*3/MM3 (ref 0.7–3.1)
LYMPHOCYTES NFR BLD AUTO: 21.6 % (ref 19.6–45.3)
MCH RBC QN AUTO: 30.3 PG (ref 26.6–33)
MCHC RBC AUTO-ENTMCNC: 32.6 G/DL (ref 31.5–35.7)
MCV RBC AUTO: 92.9 FL (ref 79–97)
MONOCYTES # BLD AUTO: 1.01 10*3/MM3 (ref 0.1–0.9)
MONOCYTES NFR BLD AUTO: 10.2 % (ref 5–12)
NEUTROPHILS NFR BLD AUTO: 6.59 10*3/MM3 (ref 1.7–7)
NEUTROPHILS NFR BLD AUTO: 66.3 % (ref 42.7–76)
PLATELET # BLD AUTO: 307 10*3/MM3 (ref 140–450)
PMV BLD AUTO: 11.5 FL (ref 6–12)
POTASSIUM SERPL-SCNC: 3.7 MMOL/L (ref 3.5–5.2)
PROT SERPL-MCNC: 7 G/DL (ref 6–8.5)
RBC # BLD AUTO: 4.23 10*6/MM3 (ref 3.77–5.28)
SODIUM SERPL-SCNC: 141 MMOL/L (ref 136–145)
WBC NRBC COR # BLD AUTO: 9.93 10*3/MM3 (ref 3.4–10.8)

## 2024-09-09 PROCEDURE — 99205 OFFICE O/P NEW HI 60 MIN: CPT | Performed by: INTERNAL MEDICINE

## 2024-09-09 PROCEDURE — 85025 COMPLETE CBC W/AUTO DIFF WBC: CPT

## 2024-09-09 PROCEDURE — 80053 COMPREHEN METABOLIC PANEL: CPT | Performed by: INTERNAL MEDICINE

## 2024-09-09 PROCEDURE — 1126F AMNT PAIN NOTED NONE PRSNT: CPT | Performed by: INTERNAL MEDICINE

## 2024-09-09 PROCEDURE — 36415 COLL VENOUS BLD VENIPUNCTURE: CPT

## 2024-09-09 PROCEDURE — 3077F SYST BP >= 140 MM HG: CPT | Performed by: INTERNAL MEDICINE

## 2024-09-09 PROCEDURE — 3080F DIAST BP >= 90 MM HG: CPT | Performed by: INTERNAL MEDICINE

## 2024-09-09 NOTE — PATIENT INSTRUCTIONS
Aromasin - Exemestane Tablets  What is this medication?  EXEMESTANE (ex e MES tane) treats breast cancer. It works by decreasing the amount of estrogen hormone your body makes, which slows or stops breast cancer cells from spreading or growing.    This medicine may be used for other purposes; ask your health care provider or pharmacist if you have questions.    COMMON BRAND NAME(S): Aromasin    What should I tell my care team before I take this medication?  They need to know if you have any of these conditions:    An unusual or allergic reaction to exemestane, other medications, foods, dyes, or preservatives  Pregnant or trying to get pregnant  Breastfeeding  How should I use this medication?  Take this medication by mouth with a glass of water. Take it as directed on the prescription label at the same time every day. Take it after a meal. Keep taking it unless your care team tells you to stop.    Talk to your care team about the use of this medication in children. Special care may be needed.    Overdosage: If you think you have taken too much of this medicine contact a poison control center or emergency room at once.    NOTE: This medicine is only for you. Do not share this medicine with others.    What if I miss a dose?  If you miss a dose, skip it. Take your next dose at the normal time. Do not take extra or 2 doses at the same time to make up for the missed dose.    What may interact with this medication?  Certain medications for seizures, such as carbamazepine, phenobarbital, phenytoin  Rifampin  Kenzie's wort  This list may not describe all possible interactions. Give your health care provider a list of all the medicines, herbs, non-prescription drugs, or dietary supplements you use. Also tell them if you smoke, drink alcohol, or use illegal drugs. Some items may interact with your medicine.    What should I watch for while using this medication?  Visit your care team for regular checks on your  progress.    If you experience hot flashes or sweating while taking this medication, avoid alcohol, smoking, and caffeine. This may help to decrease these side effects.    Using this medication for a long time may weaken your bones. The risk of bone fractures may be increased. Talk to your care team about your bone health.    Talk to your care team if you may be pregnant. Serious birth defects can occur if you take this medication during pregnancy and for 1 month after the last dose. You will need a negative pregnancy test before starting this medication. Contraception is recommended while taking this medication and for 1 month after the last dose. Your care team can help you find the option that works for you.    Do not breastfeed while taking this medication and for 1 month after the last dose.    This medication may cause infertility. Talk to your care team if you are concerned about your fertility.    What side effects may I notice from receiving this medication?  Side effects that you should report to your care team as soon as possible:    Allergic reactions--skin rash, itching, hives, swelling of the face, lips, tongue, or throat  Side effects that usually do not require medical attention (report to your care team if they continue or are bothersome):    Fatigue  Headache  Hot flashes  Joint pain  Nausea  Sweating  Trouble sleeping  This list may not describe all possible side effects. Call your doctor for medical advice about side effects. You may report side effects to FDA at 2-845-FDA-1885.    Where should I keep my medication?  Keep out of the reach of children and pets.    Store at room temperature between 20 and 25 degrees C (68 and 77 degrees F). Get rid of any unused medication after the expiration date.    To get rid of medications that are no longer needed or have :    Take the medication to a medication take-back program. Check with your pharmacy or law enforcement to find a location.  If you  cannot return the medication, ask your pharmacist or care team how to get rid of this medication safely.  NOTE: This sheet is a summary. It may not cover all possible information. If you have questions about this medicine, talk to your doctor, pharmacist, or health care provider.    © 2024 [employer] (2023-05-11 00:00:00)    Additional Information From [employer] About Aromasin  Self-Care Tips:  Take Exemestane after a meal; at about the same time every day.  Exemestane causes little nausea. But if you should experience nausea, take anti-nausea medications are prescribed by your doctor, and eat small frequent meals. Such on lozenges and chewing gum may also help.  In general, drinking alcoholic beverages should be kept to a minimum or avoided completely. You should discuss this with your doctor.  If you are experiencing hot flashes, wearing light clothing, staying in a cool environment, and putting cool cloths on your head may reduce symptoms. Consult your health care provider if these worsen, or become intolerable.  Acetaminophen or ibuprofen may help relieve discomfort generalized aches and pains. However, be sure to talk with your doctor before taking it.  Get plenty of rest.  Maintain good nutrition.  If you experience symptoms or side effects, be sure to discuss them with your health care team. They can prescribe medications and/or offer other suggestions that are effective in managing such problems.    When to contact your doctor or health care provider:  Contact your health care provider immediately, day or night, if you should experience any of the following symptoms:    Shortness of breath or difficulty breathing  Having thoughts or feeling like you may want to harm yourself or others  The following symptoms require medical attention, but are not an emergency. Contact your health care provider within 24 hours of noticing any of the following:    Vaginal bleeding (similar to a period)  Always  inform your health care provider if you experience any unusual symptoms.

## 2024-09-09 NOTE — PROGRESS NOTES
Hematology/Oncology Outpatient Consultation    Patient name: Ibeth Kiser  : 1955  MRN: [id number]  Primary Care Physician: Chelo Saavedra MD  Referring Physician: Damien Nguyen APRN  Reason For Consult:     Chief Complaint   Patient presents with    Appointment     Atypical lobular hyperplasia (ALH) of right breast       History of Present Illness:      This is 68-year-old female referred secondary to atypical lobular hyperplasia of the right breast.  She had bilateral screening mammogram completed 2024, this basically showed asymmetric density within the right subareolar area.  No additional suspicious mass was seen architectural distortion.  Breast arterial calcifications are present.  Spot compression views as well as true lateral view and ultrasound was recommended for further evaluation.    On 2024 she had a right diagnostic mammogram which showed persistent ovoid asymmetry approximately 2 cm above the nipple but no architectural distortion was seen the right breast ultrasound this showed what appears to be a mildly dilated duct containing an echogenic mass measuring 5 x 3 mm.  This may represent an intraductal papilloma.  Debris within the duct could also give the same appearance.    Ultrasound-guided core biopsy was recommended for further evaluation    She had a biopsy of the right breast mass at the 12 o'clock position, this showed focal atypical lobular hyperplasia but no malignancy identified.      2024 patient had bilateral breast MRI there is a biopsy clip in the right breast at the 12 o'clock position but no suspicious mass or non-mass enhancement was identified.  There was no axillary or internal mammary adenopathy.  There was an approximate 1.5 x 1.3 cm area of enhancement noted on the right upper rib which is asymmetric with the left side is incidental and of uncertain etiology CT of the chest was recommended to further evaluate    CT scan of the chest  ordered and is pending      There is no significant family history of cancer    She took HRT for about one year,     She  does not smoke  She drinks occasionally    She is retired     She is single        Past Medical History:   Diagnosis Date    Allergic     Ankle fracture 03/2020    right    Anxiety     Atrial fibrillation     Cancer     skin can- right elbow     Congenital heart failure     Patient states she does not have this    Diabetes mellitus     GERD (gastroesophageal reflux disease)     Hot flashes     Hypertension     Injury of back     Myocardial infarct 2018    Primary biliary cirrhosis 2013    Sleep apnea     CPAP- to bring dos       Past Surgical History:   Procedure Laterality Date    ANKLE OPEN REDUCTION INTERNAL FIXATION Right 03/26/2020    Procedure: ANKLE OPEN REDUCTION INTERNAL FIXATION lateral malleolus with syndesmotic fixation;  Surgeon: Kaleb Contreras MD;  Location: [facility] MAIN OR;  Service: Orthopedics;  Laterality: Right;    ANKLE OPEN REDUCTION INTERNAL FIXATION Left 04/20/2023    Procedure: ANKLE OPEN REDUCTION INTERNAL FIXATION;  Surgeon: Juvenal Mcnulty MD;  Location: [facility] MAIN OR;  Service: Orthopedics;  Laterality: Left;    AV NODE ABLATION  09/14/2018    BREAST BIOPSY Right 2024    COLONOSCOPY      CYSTOSCOPY, URETEROSCOPY, RETROGRADE PYELOGRAM, STENT INSERTION Right 11/01/2021    Procedure: CYSTOSCOPY, right  URETEROSCOPY, right RETROGRADE PYELOGRAM, balloon dilation of right ureteral stricture, right ureteral stent placement STENT INSERTION;  Surgeon: Chuck Lewis MD;  Location: [facility] OR;  Service: Urology;  Laterality: Right;    ENDOSCOPY      FRACTURE SURGERY      HARDWARE REMOVAL Right 07/28/2020    Procedure: RIGHT ANKLE HARDWARE REMOVAL;  Surgeon: Kaleb Contreras MD;  Location: [facility] OR;  Service: Orthopedics;  Laterality: Right;    LIVER BIOPSY      SKIN BIOPSY      SKIN CANCER EXCISION  2000    melanoma - right arm - Dr. Mcdaniel    TIBIA IM  NAILING/RODDING Left 12/06/2023    Procedure: TIBIA INTRAMEDULLARY NAIL/TOBIN INSERTION;  Surgeon: Luigi Alvarado MD;  Location: [facility] MAIN OR;  Service: Orthopedics;  Laterality: Left;    UPPER ENDOSCOPIC ULTRASOUND W/ FNA N/A 02/15/2022    Procedure: EUS GUIDED LIVER BX;  Surgeon: Sarina Jarquin MD;  Location: [facility] ENDOSCOPY;  Service: Gastroenterology;  Laterality: N/A;  esophagitis, hiatal hernia         Current Outpatient Medications:     Accu-Chek FastClix Lancets misc, USE AS DIRECTED TO TEST BLOOD SUGAR ONCE DAILY, Disp: 102 each, Rfl: 0    amoxicillin (AMOXIL) 875 MG tablet, TAKE 1 TABLET BY MOUTH TWICE DAILY UNTIL ALL TAKEN, Disp: , Rfl:     apixaban (ELIQUIS) 5 MG tablet tablet, Take 1 tablet by mouth Every 12 (Twelve) Hours., Disp: 180 tablet, Rfl: 4    atorvastatin (LIPITOR) 20 MG tablet, Take 1 tablet by mouth Daily., Disp: 90 tablet, Rfl: 0    Blood Glucose Monitoring Suppl (ACCU-CHEK LAKEISHA) device, Use as instructed to check blood sugar, Disp: 1 each, Rfl: 0    cefdinir (OMNICEF) 300 MG capsule, Take 1 capsule by mouth 2 (Two) Times a Day., Disp: 10 capsule, Rfl: 0    dilTIAZem CD (CARDIZEM CD) 240 MG 24 hr capsule, TAKE 1 CAPSULE BY MOUTH EVERY MORNING, Disp: 90 capsule, Rfl: 3    escitalopram (LEXAPRO) 20 MG tablet, TAKE 1 TABLET BY MOUTH DAILY, Disp: 90 tablet, Rfl: 1    furosemide (LASIX) 20 MG tablet, Take 1 tablet by mouth Daily., Disp: 90 tablet, Rfl: 3    glucose blood (Accu-Chek Lakeisha) test strip, Use as instructed to check blood sugar once a day, Disp: 100 each, Rfl: 1    HYDROcodone-acetaminophen (NORCO) 5-325 MG per tablet, Take 1 tablet by mouth Every 6 (Six) Hours As Needed for Moderate Pain or Severe Pain., Disp: 12 tablet, Rfl: 0    meloxicam (MOBIC) 7.5 MG tablet, Take 1 tablet by mouth Daily As Needed for Moderate Pain., Disp: 30 tablet, Rfl: 4    methocarbamol (ROBAXIN) 500 MG tablet, Take 1 tablet by mouth 3 (Three) Times a Day As Needed for Muscle Spasms., Disp: 90  tablet, Rfl: 0    metoprolol succinate XL (TOPROL-XL) 200 MG 24 hr tablet, TAKE 1 TABLET BY MOUTH EVERY DAY, Disp: 100 tablet, Rfl: 3    Mounjaro 5 MG/0.5ML solution pen-injector pen, Inject 0.5 mL under the skin into the appropriate area as directed 1 (One) Time Per Week., Disp: 2 mL, Rfl: 0    OCALIVA 5 MG tablet, Take 1 tablet by mouth Daily. 2 pm (test drug) to reduce LE's, Disp: , Rfl:     ondansetron ODT (ZOFRAN-ODT) 8 MG disintegrating tablet, Place 1 tablet on the tongue Every 8 (Eight) Hours As Needed for Nausea or Vomiting., Disp: 10 tablet, Rfl: 0    pantoprazole (Protonix) 20 MG EC tablet, Take 1 tablet by mouth Daily. Take day of surgery, Disp: 90 tablet, Rfl: 1    ursodiol (ACTIGALL) 500 MG tablet, Take 1 tablet by mouth 3 (Three) Times a Day., Disp: , Rfl:     Allergies   Allergen Reactions    Codeine Anxiety    Lisinopril Hives    Kiwi Extract Swelling       Immunization History   Administered Date(s) Administered    COVID-19 (PFIZER) Purple Cap Monovalent 12/18/2020, 01/08/2021, 12/29/2021    Fluzone High-Dose 65+YRS 12/17/2019    Fluzone High-Dose 65+yrs 11/27/2023    Fluzone Quad >6mos (Multi-dose) 12/17/2019    H1N1 Inj Preservative Free 12/15/2009    Hepatitis A 07/09/2018    Hepatitis B Adult/Adolescent IM 07/09/2018    Influenza MDCK Quadrivalent with Preserative 12/17/2019    Pneumococcal Conjugate 13-Valent (PCV13) 11/15/2017    Pneumococcal Conjugate 20-Valent (PCV20) 07/17/2023       Family History   Problem Relation Age of Onset    Multiple myeloma Mother     Heart disease Mother        Cancer-related family history is not on file.    Social History     Tobacco Use    Smoking status: Never     Passive exposure: Never    Smokeless tobacco: Never   Vaping Use    Vaping status: Never Used   Substance Use Topics    Alcohol use: Not Currently     Alcohol/week: 2.0 standard drinks of alcohol     Types: 2 Glasses of wine per week     Comment: s0cial    Drug use: Never       ROS:    Review of  "Systems   Constitutional:  Negative for chills, fatigue and fever.   HENT:  Negative for congestion, drooling, ear discharge, rhinorrhea, sinus pressure and tinnitus.    Eyes:  Negative for photophobia, pain and discharge.   Respiratory:  Negative for apnea, choking and stridor.    Cardiovascular:  Negative for palpitations.   Gastrointestinal:  Negative for abdominal distention, abdominal pain and anal bleeding.   Endocrine: Negative for polydipsia and polyphagia.   Genitourinary:  Negative for decreased urine volume, flank pain and genital sores.   Musculoskeletal:  Negative for gait problem, neck pain and neck stiffness.   Skin:  Negative for color change, rash and wound.   Neurological:  Negative for tremors, seizures, syncope, facial asymmetry and speech difficulty.   Hematological:  Negative for adenopathy.   Psychiatric/Behavioral:  Negative for agitation, confusion, hallucinations and self-injury. The patient is not hyperactive.        Objective:    Vitals:    09/09/24 1154   BP: (!) 192/100   Pulse: 50   Resp: 18   Temp: 98 °F (36.7 °C)   TempSrc: Infrared   SpO2: 96%   Weight: 88 kg (194 lb)   Height: 167.6 cm (66\")   PainSc: 0-No pain     Body mass index is 31.31 kg/m².    ECOG    (0) Fully active, able to carry on all predisease performance without restriction    Physical Exam:  Physical Exam  Vitals and nursing note reviewed.   Constitutional:       General: She is not in acute distress.     Appearance: She is not diaphoretic.   HENT:      Head: Normocephalic and atraumatic.   Eyes:      General: No scleral icterus.        Right eye: No discharge.         Left eye: No discharge.      Conjunctiva/sclera: Conjunctivae normal.   Neck:      Thyroid: No thyromegaly.   Cardiovascular:      Rate and Rhythm: Normal rate and regular rhythm.      Heart sounds: Normal heart sounds.      No friction rub. No gallop.   Pulmonary:      Effort: Pulmonary effort is normal. No respiratory distress.      Breath sounds: No " stridor. No wheezing.   Abdominal:      General: Bowel sounds are normal.      Palpations: Abdomen is soft. There is no mass.      Tenderness: There is no abdominal tenderness. There is no guarding or rebound.   Musculoskeletal:         General: No tenderness. Normal range of motion.      Cervical back: Normal range of motion and neck supple.   Lymphadenopathy:      Cervical: No cervical adenopathy.   Skin:     General: Skin is warm.      Findings: No erythema or rash.   Neurological:      Mental Status: She is alert and oriented to person, place, and time.      Motor: No abnormal muscle tone.   Psychiatric:         Behavior: Behavior normal.         RECENT LABS  WBC   Date Value Ref Range Status   09/09/2024 9.93 3.40 - 10.80 10*3/mm3 Final     RBC   Date Value Ref Range Status   09/09/2024 4.23 3.77 - 5.28 10*6/mm3 Final     Hemoglobin   Date Value Ref Range Status   09/09/2024 12.8 12.0 - 15.9 g/dL Final     Hematocrit   Date Value Ref Range Status   09/09/2024 39.3 34.0 - 46.6 % Final     MCV   Date Value Ref Range Status   09/09/2024 92.9 79.0 - 97.0 fL Final     MCH   Date Value Ref Range Status   09/09/2024 30.3 26.6 - 33.0 pg Final     MCHC   Date Value Ref Range Status   09/09/2024 32.6 31.5 - 35.7 g/dL Final     RDW   Date Value Ref Range Status   09/09/2024 14.5 12.3 - 15.4 % Final     RDW-SD   Date Value Ref Range Status   09/09/2024 47.6 37.0 - 54.0 fl Final     MPV   Date Value Ref Range Status   09/09/2024 11.5 6.0 - 12.0 fL Final     Platelets   Date Value Ref Range Status   09/09/2024 307 140 - 450 10*3/mm3 Final     Neutrophil %   Date Value Ref Range Status   09/09/2024 66.3 42.7 - 76.0 % Final     Lymphocyte %   Date Value Ref Range Status   09/09/2024 21.6 19.6 - 45.3 % Final     Monocyte %   Date Value Ref Range Status   09/09/2024 10.2 5.0 - 12.0 % Final     Eosinophil %   Date Value Ref Range Status   09/09/2024 1.3 0.3 - 6.2 % Final     Basophil %   Date Value Ref Range Status   09/09/2024  0.6 0.0 - 1.5 % Final     Immature Grans %   Date Value Ref Range Status   08/20/2024 0.4 0.0 - 0.5 % Final     Neutrophils, Absolute   Date Value Ref Range Status   09/09/2024 6.59 1.70 - 7.00 10*3/mm3 Final     Lymphocytes, Absolute   Date Value Ref Range Status   09/09/2024 2.14 0.70 - 3.10 10*3/mm3 Final     Monocytes, Absolute   Date Value Ref Range Status   09/09/2024 1.01 (H) 0.10 - 0.90 10*3/mm3 Final     Eosinophils, Absolute   Date Value Ref Range Status   09/09/2024 0.13 0.00 - 0.40 10*3/mm3 Final     Basophils, Absolute   Date Value Ref Range Status   09/09/2024 0.06 0.00 - 0.20 10*3/mm3 Final     Immature Grans, Absolute   Date Value Ref Range Status   08/20/2024 0.05 0.00 - 0.05 10*3/mm3 Final     nRBC   Date Value Ref Range Status   08/20/2024 0.0 0.0 - 0.2 /100 WBC Final       Lab Results   Component Value Date    GLUCOSE 128 (H) 08/20/2024    BUN 15 08/20/2024    CREATININE 0.60 08/21/2024    EGFRIFNONA 99 11/01/2021    BCR 24.6 08/20/2024    K 3.9 08/20/2024    CO2 25.2 08/20/2024    CALCIUM 9.5 08/20/2024    ALBUMIN 3.9 06/12/2024    LABIL2 1.2 09/14/2018    AST 38 (H) 06/12/2024    ALT 29 06/12/2024         Assessment & Plan   Atypical lobular hyperplasia (ALH) of right breast  - CBC & Differential    Lesion of liver  - CBC & Differential      Apical lobular hyperplasia right breast, status post postbiopsy:  Patient was seen by Dr. Bryant, surgical resection not recommended for follow-up in 6 months  Rib lesion CT of the chest ordered per primary.  This is pending  A-fib: On Eliquis.  Status post cardioversion.  Patient is established with Dr. Thibodeaux               Plans;    Discussed atypical lobular hyperplasia and its relationship to increased risk for breast cancer.  Discussed chemoprophylaxis with patient.  Aromatase inhibitor has been recommended for her due to history of A-fib, on anticoagulation and risk of strokes.  We will therefore avoid tamoxifen  Discussed the side effects and  benefits of aromatase inhibitor in detail with patient including but not limited to:  Side effects of aromatase inhibitors include risk of musculoskeletal side effect including arthralgia, myalgia, especially in patients who have underlying musculoskeletal disease such as osteoarthritis, hot flashes, mood changes. There is risk of vaginal dryness, dyspareunia and other sexual dysfunction. Other side effects include degree of cognitive symptoms compared to women not on endocrine therapy. There is possibility of fatigue, forgetfulness, poor sleep hygiene, osteoporosis, osteopenia, risk of fractures, cardiovascular disease and hypercholestolemia. There is also possibility of reactivation of ovarian function especially in women who were premenopausal at time of diagnosis and became amenorrheic while on chemotherapy. The duration of treatment is also for minimum of five years and possibly extending therapy in some women with higher risk features beyond five years. We also discussed that the AI have similar efficacy in the adjuvant setting. Therefore, no one AI is preferred over another. I will obtain a bone density at baseline if none has been done and every two years after that. If there is evidence of osteopenia or osteoporosis, there will be recommendation for bisphosphonate therapy for the duration of aromatase inhibitor therapy and possibly longer depending on bone health status at completion of therapy. We also discussed that labs will be done with follow ups and lipid panel will be done on an annual basis.   Will schedule bone density  Patient was given information on Aromasin to review  CMP today  Follow-up 4 weeks  Discussed surveillance imaging with patient.    Diagnostic mammogram will be due again in January 2025  All questions answered        I spent 60 total minutes, face-to-face, caring for Ibeth today. 90% of this time involved counseling and/or coordination of care as documented within this note.

## 2024-09-11 ENCOUNTER — PATIENT ROUNDING (BHMG ONLY) (OUTPATIENT)
Dept: ONCOLOGY | Facility: CLINIC | Age: 69
End: 2024-09-11
Payer: MEDICARE

## 2024-09-11 ENCOUNTER — LAB (OUTPATIENT)
Dept: FAMILY MEDICINE CLINIC | Facility: CLINIC | Age: 69
End: 2024-09-11
Payer: MEDICARE

## 2024-09-11 ENCOUNTER — OFFICE VISIT (OUTPATIENT)
Dept: FAMILY MEDICINE CLINIC | Facility: CLINIC | Age: 69
End: 2024-09-11
Payer: MEDICARE

## 2024-09-11 VITALS
OXYGEN SATURATION: 96 % | HEART RATE: 55 BPM | HEIGHT: 66 IN | WEIGHT: 201 LBS | RESPIRATION RATE: 16 BRPM | DIASTOLIC BLOOD PRESSURE: 83 MMHG | TEMPERATURE: 97.1 F | BODY MASS INDEX: 32.3 KG/M2 | SYSTOLIC BLOOD PRESSURE: 167 MMHG

## 2024-09-11 DIAGNOSIS — K21.9 GASTROESOPHAGEAL REFLUX DISEASE WITHOUT ESOPHAGITIS: ICD-10-CM

## 2024-09-11 DIAGNOSIS — E11.9 TYPE 2 DIABETES MELLITUS WITHOUT COMPLICATION, WITHOUT LONG-TERM CURRENT USE OF INSULIN: Primary | ICD-10-CM

## 2024-09-11 DIAGNOSIS — E55.9 VITAMIN D DEFICIENCY: ICD-10-CM

## 2024-09-11 DIAGNOSIS — E53.8 VITAMIN B12 DEFICIENCY: ICD-10-CM

## 2024-09-11 DIAGNOSIS — N39.41 URGE INCONTINENCE OF URINE: ICD-10-CM

## 2024-09-11 DIAGNOSIS — E78.2 MIXED HYPERLIPIDEMIA: ICD-10-CM

## 2024-09-11 PROCEDURE — 3044F HG A1C LEVEL LT 7.0%: CPT | Performed by: FAMILY MEDICINE

## 2024-09-11 PROCEDURE — 36415 COLL VENOUS BLD VENIPUNCTURE: CPT | Performed by: FAMILY MEDICINE

## 2024-09-11 PROCEDURE — 3079F DIAST BP 80-89 MM HG: CPT | Performed by: FAMILY MEDICINE

## 2024-09-11 PROCEDURE — 99214 OFFICE O/P EST MOD 30 MIN: CPT | Performed by: FAMILY MEDICINE

## 2024-09-11 PROCEDURE — G2211 COMPLEX E/M VISIT ADD ON: HCPCS | Performed by: FAMILY MEDICINE

## 2024-09-11 PROCEDURE — 3077F SYST BP >= 140 MM HG: CPT | Performed by: FAMILY MEDICINE

## 2024-09-11 PROCEDURE — 82306 VITAMIN D 25 HYDROXY: CPT | Performed by: FAMILY MEDICINE

## 2024-09-11 PROCEDURE — 1126F AMNT PAIN NOTED NONE PRSNT: CPT | Performed by: FAMILY MEDICINE

## 2024-09-11 PROCEDURE — 82607 VITAMIN B-12: CPT | Performed by: FAMILY MEDICINE

## 2024-09-11 RX ORDER — PANTOPRAZOLE SODIUM 20 MG/1
20 TABLET, DELAYED RELEASE ORAL EVERY 24 HOURS
Qty: 90 TABLET | Refills: 1 | Status: SHIPPED | OUTPATIENT
Start: 2024-09-11

## 2024-09-11 RX ORDER — TIRZEPATIDE 7.5 MG/.5ML
7.5 INJECTION, SOLUTION SUBCUTANEOUS WEEKLY
Qty: 2 ML | Refills: 2 | Status: SHIPPED | OUTPATIENT
Start: 2024-09-11

## 2024-09-11 RX ORDER — ATORVASTATIN CALCIUM 20 MG/1
20 TABLET, FILM COATED ORAL DAILY
Qty: 90 TABLET | Refills: 0 | Status: SHIPPED | OUTPATIENT
Start: 2024-09-11

## 2024-09-11 RX ORDER — VIBEGRON 75 MG/1
1 TABLET, FILM COATED ORAL DAILY
Qty: 30 TABLET | Refills: 2 | Status: SHIPPED | OUTPATIENT
Start: 2024-09-11

## 2024-09-11 NOTE — PROGRESS NOTES
Subjective   Chief Complaint   Patient presents with    Diabetes     3 month follow up    Hyperlipidemia    Hypertension    Med Refill    GI Problem     Ibeth Kiser is a 68 y.o. female.     Patient Care Team:  Chelo Saavedra MD as PCP - General  Kwadwo Thibodeaux MD as Consulting Physician (Cardiology)  Cornelio Ibrahim MD as Consulting Physician (Gastroenterology)  Sarah De La Cruz, RN as Ambulatory  ([facility])  Paula Saab MD as Consulting Physician (Hematology and Oncology)  Damien Nguyen APRN as Nurse Practitioner (Breast Surgery)  Paula Saab MD as Consulting Physician (Hematology and Oncology)    History of Present Illness  She is coming in today to follow-up on her chronic medical problems and her medications including diabetes, hypertension, hyperlipidemia, and GI issues.  Patient was started on Mounjaro in 6/2024 and she reports doing very well with that.  She feels that medication is curbing her appetite and she denies any side effects.  She was previously on Ozempic, but this was discontinued earlier this year due to ongoing GI side effects.  She also wants to talk about her urinary incontinence symptoms which she has been experiencing for some time.  She reports having issues with urge incontinence, her symptoms are getting worse and she wonders if something can be done to help with that.  Patient recently had a breast biopsy done which was consistent with focal atypical lobular hyperplasia.  She was referred for consultation to the breast surgery office and also oncology office.  Aromatase inhibitors were recommended, but patient feels very leery about starting this medication due to possible side effects, she is scheduled to follow-up with oncology office on 10/10/2024.       The following portions of the patient's history were reviewed and updated as appropriate: allergies, current medications, past family history, past medical history, past  social history, past surgical history, and problem list.  Past Medical History:   Diagnosis Date    Allergic     Ankle fracture 03/2020    right    Anxiety     Atrial fibrillation     Cancer     skin can- right elbow     Congenital heart failure     Patient states she does not have this    Diabetes mellitus     GERD (gastroesophageal reflux disease)     Hot flashes     Hypertension     Injury of back     Myocardial infarct 2018    Primary biliary cirrhosis 2013    Sleep apnea     CPAP- to bring dos     Past Surgical History:   Procedure Laterality Date    ANKLE OPEN REDUCTION INTERNAL FIXATION Right 03/26/2020    Procedure: ANKLE OPEN REDUCTION INTERNAL FIXATION lateral malleolus with syndesmotic fixation;  Surgeon: Kaleb Contreras MD;  Location: [facility] OR;  Service: Orthopedics;  Laterality: Right;    ANKLE OPEN REDUCTION INTERNAL FIXATION Left 04/20/2023    Procedure: ANKLE OPEN REDUCTION INTERNAL FIXATION;  Surgeon: Juvenal Mcnulty MD;  Location: [facility] OR;  Service: Orthopedics;  Laterality: Left;    AV NODE ABLATION  09/14/2018    BREAST BIOPSY Right 2024    COLONOSCOPY      CYSTOSCOPY, URETEROSCOPY, RETROGRADE PYELOGRAM, STENT INSERTION Right 11/01/2021    Procedure: CYSTOSCOPY, right  URETEROSCOPY, right RETROGRADE PYELOGRAM, balloon dilation of right ureteral stricture, right ureteral stent placement STENT INSERTION;  Surgeon: Chuck Lewis MD;  Location: [facility] OR;  Service: Urology;  Laterality: Right;    ENDOSCOPY      FRACTURE SURGERY      HARDWARE REMOVAL Right 07/28/2020    Procedure: RIGHT ANKLE HARDWARE REMOVAL;  Surgeon: Kaleb Contreras MD;  Location: [facility] OR;  Service: Orthopedics;  Laterality: Right;    LIVER BIOPSY      SKIN BIOPSY      SKIN CANCER EXCISION  2000    melanoma - right arm - Dr. Mcdaniel    TIBIA IM NAILING/RODDING Left 12/06/2023    Procedure: TIBIA INTRAMEDULLARY NAIL/TOBIN INSERTION;  Surgeon: Luigi Alvarado MD;  Location: [facility] OR;  Service:  "Orthopedics;  Laterality: Left;    UPPER ENDOSCOPIC ULTRASOUND W/ FNA N/A 02/15/2022    Procedure: EUS GUIDED LIVER BX;  Surgeon: Sarina Jarquin MD;  Location: [facility] ENDOSCOPY;  Service: Gastroenterology;  Laterality: N/A;  esophagitis, hiatal hernia     The patient has a family history of  Family History   Problem Relation Age of Onset    Multiple myeloma Mother     Heart disease Mother      Social History     Socioeconomic History    Marital status:    Tobacco Use    Smoking status: Never     Passive exposure: Never    Smokeless tobacco: Never   Vaping Use    Vaping status: Never Used   Substance and Sexual Activity    Alcohol use: Not Currently     Alcohol/week: 2.0 standard drinks of alcohol     Types: 2 Glasses of wine per week     Comment: s0cial    Drug use: Never    Sexual activity: Not Currently       Review of Systems   Constitutional:  Negative for activity change, fatigue and fever.   Respiratory:  Negative for cough, shortness of breath and wheezing.    Cardiovascular:  Negative for chest pain, palpitations and leg swelling.   Gastrointestinal:  Negative for constipation, diarrhea and indigestion.   Genitourinary:  Positive for frequency and urgency. Negative for difficulty urinating, dysuria and vaginal pain.   Skin:  Negative for color change, dry skin and rash.   Neurological:  Negative for tremors and headache.     Visit Vitals  /83 (BP Location: Left arm, Patient Position: Sitting, Cuff Size: Adult)   Pulse 55   Temp 97.1 °F (36.2 °C) (Infrared)   Resp 16   Ht 167.6 cm (65.98\")   Wt 91.2 kg (201 lb)   SpO2 96%   BMI 32.46 kg/m²              Current Outpatient Medications:     atorvastatin (LIPITOR) 20 MG tablet, Take 1 tablet by mouth Daily., Disp: 90 tablet, Rfl: 0    pantoprazole (Protonix) 20 MG EC tablet, Take 1 tablet by mouth Daily. Take day of surgery, Disp: 90 tablet, Rfl: 1    Accu-Chek FastClix Lancets misc, USE AS DIRECTED TO TEST BLOOD SUGAR ONCE DAILY, Disp: 102 each, " Rfl: 0    amoxicillin (AMOXIL) 875 MG tablet, TAKE 1 TABLET BY MOUTH TWICE DAILY UNTIL ALL TAKEN, Disp: , Rfl:     apixaban (ELIQUIS) 5 MG tablet tablet, Take 1 tablet by mouth Every 12 (Twelve) Hours., Disp: 180 tablet, Rfl: 4    Blood Glucose Monitoring Suppl (ACCU-CHEK KRYSTEN) device, Use as instructed to check blood sugar, Disp: 1 each, Rfl: 0    cefdinir (OMNICEF) 300 MG capsule, Take 1 capsule by mouth 2 (Two) Times a Day., Disp: 10 capsule, Rfl: 0    dilTIAZem CD (CARDIZEM CD) 240 MG 24 hr capsule, TAKE 1 CAPSULE BY MOUTH EVERY MORNING, Disp: 90 capsule, Rfl: 3    escitalopram (LEXAPRO) 20 MG tablet, TAKE 1 TABLET BY MOUTH DAILY, Disp: 90 tablet, Rfl: 1    furosemide (LASIX) 20 MG tablet, Take 1 tablet by mouth Daily., Disp: 90 tablet, Rfl: 3    glucose blood (Accu-Chek Krysten) test strip, Use as instructed to check blood sugar once a day, Disp: 100 each, Rfl: 1    HYDROcodone-acetaminophen (NORCO) 5-325 MG per tablet, Take 1 tablet by mouth Every 6 (Six) Hours As Needed for Moderate Pain or Severe Pain., Disp: 12 tablet, Rfl: 0    meloxicam (MOBIC) 7.5 MG tablet, Take 1 tablet by mouth Daily As Needed for Moderate Pain., Disp: 30 tablet, Rfl: 4    methocarbamol (ROBAXIN) 500 MG tablet, Take 1 tablet by mouth 3 (Three) Times a Day As Needed for Muscle Spasms., Disp: 90 tablet, Rfl: 0    metoprolol succinate XL (TOPROL-XL) 200 MG 24 hr tablet, TAKE 1 TABLET BY MOUTH EVERY DAY, Disp: 100 tablet, Rfl: 3    Mounjaro 7.5 MG/0.5ML solution pen-injector pen, Inject 0.5 mL under the skin into the appropriate area as directed 1 (One) Time Per Week., Disp: 2 mL, Rfl: 2    OCALIVA 5 MG tablet, Take 1 tablet by mouth Daily. 2 pm (test drug) to reduce LE's, Disp: , Rfl:     ondansetron ODT (ZOFRAN-ODT) 8 MG disintegrating tablet, Place 1 tablet on the tongue Every 8 (Eight) Hours As Needed for Nausea or Vomiting., Disp: 10 tablet, Rfl: 0    ursodiol (ACTIGALL) 500 MG tablet, Take 1 tablet by mouth 3 (Three) Times a Day.,  Disp: , Rfl:     Vibegron (Gemtesa) 75 MG tablet, Take 1 tablet by mouth Daily., Disp: 30 tablet, Rfl: 2    Objective   Physical Exam  Constitutional:       General: She is not in acute distress.     Appearance: Normal appearance. She is well-developed. She is not ill-appearing or diaphoretic.      Comments: Patient is in no distress, patient has normal voice and speech.  Normal respiratory effort.   HENT:      Head: Normocephalic and atraumatic.   Pulmonary:      Effort: Pulmonary effort is normal.   Musculoskeletal:      Cervical back: Normal range of motion and neck supple.   Neurological:      General: No focal deficit present.      Mental Status: She is alert and oriented to person, place, and time. Mental status is at baseline.   Psychiatric:         Mood and Affect: Mood normal.         MRI Breast Bilateral Diagnostic W WO Contrast    Result Date: 8/23/2024  Impression:  1. No MR signs of malignancy in either breast. 2. Focal approximate 1.5 x 1.6 cm area of nodular enhancement within an anterior right rib near the costochondral junction. This is nonspecific but asymmetric with the contralateral left side. A rib lesion or focal inflammation/costochondritis could have this appearance. Consider further evaluation with a chest CT as well as correlation with clinical scenario.   ASSESSMENT: BI-RADS 2. Benign findings.      Electronically Signed By-Rafita Bass DO On:8/23/2024 1:50 PM      CT Abdomen Pelvis Stone Protocol    Result Date: 8/20/2024  Impression: Urinary bladder wall thickening may indicate cystitis or chronic outlet obstruction. No renal or ureteric calculi. Electronically Signed: Anthony Armstrong MD  8/20/2024 5:48 PM EDT  Workstation ID: [id number]     FOLLOWING LABS WERE REVIEWED TODAY:  CMP          8/20/2024    17:05 8/21/2024    16:32 9/9/2024    13:18   CMP   Glucose 128   107    BUN 15   23    Creatinine 0.61  0.60  0.70    EGFR 98.7  99.1  95.5    Sodium 142   141    Potassium 3.9   3.7     Chloride 105   103    Calcium 9.5   9.6    Total Protein   7.0    Albumin   4.2    Globulin   2.8    Total Bilirubin   0.5    Alkaline Phosphatase   449    AST (SGOT)   39    ALT (SGPT)   33    Albumin/Globulin Ratio   1.5    BUN/Creatinine Ratio 24.6   32.9    Anion Gap 11.8   10.0      Lipid Panel          11/27/2023    13:31 3/8/2024    14:12 6/12/2024    13:58   Lipid Panel   Total Cholesterol 215  175  226    Triglycerides 136  86  137    HDL Cholesterol 106  69  66    VLDL Cholesterol 22  16  24    LDL Cholesterol  87  90  136    LDL/HDL Ratio 0.77  1.29  2.01      TSH          7/17/2024    15:25   TSH   TSH 3.090      Most Recent A1C          6/12/2024    13:58   HGBA1C Most Recent   Hemoglobin A1C 5.90           CHADS-VASc Risk Assessment               4 Total Score    1 Hypertension    1 DM    1 Age 65-74    1 Sex: Female    Criteria that do not apply:    CHF    Age >/= 75    PRIOR STROKE/TIA/THROMBO    Vascular Disease             Assessment & Plan   Diagnoses and all orders for this visit:    1. Type 2 diabetes mellitus without complication, without long-term current use of insulin (Primary)  -     Mounjaro 7.5 MG/0.5ML solution pen-injector pen; Inject 0.5 mL under the skin into the appropriate area as directed 1 (One) Time Per Week.  Dispense: 2 mL; Refill: 2    2. Mixed hyperlipidemia  -     atorvastatin (LIPITOR) 20 MG tablet; Take 1 tablet by mouth Daily.  Dispense: 90 tablet; Refill: 0    3. Gastroesophageal reflux disease without esophagitis  -     pantoprazole (Protonix) 20 MG EC tablet; Take 1 tablet by mouth Daily. Take day of surgery  Dispense: 90 tablet; Refill: 1    4. Urge incontinence of urine  -     Vibegron (Gemtesa) 75 MG tablet; Take 1 tablet by mouth Daily.  Dispense: 30 tablet; Refill: 2    5. Vitamin D deficiency  -     Vitamin D,25-Hydroxy    6. Vitamin B12 deficiency  -     Vitamin B12      I reviewed her medical problems and her medications as well as her previous labs.  She has  been on Mounjaro since 6/2024 and has been tolerating it well.  I will be increasing the dose from 5 mg to 7.5 mg.  Healthy lifestyle was also reinforced.  We also addressed her incontinence symptoms which are poorly controlled and I will be starting her on Gemtesa.  Patient is scheduled to follow-up with oncology office for further management of her recent abnormal mammogram which was consistent with focal atypical lobular hyperplasia.        Return in about 3 months (around 12/11/2024) for Next scheduled follow up.    Requested Prescriptions     Signed Prescriptions Disp Refills    Mounjaro 7.5 MG/0.5ML solution pen-injector pen 2 mL 2     Sig: Inject 0.5 mL under the skin into the appropriate area as directed 1 (One) Time Per Week.    pantoprazole (Protonix) 20 MG EC tablet 90 tablet 1     Sig: Take 1 tablet by mouth Daily. Take day of surgery    Vibegron (Gemtesa) 75 MG tablet 30 tablet 2     Sig: Take 1 tablet by mouth Daily.    atorvastatin (LIPITOR) 20 MG tablet 90 tablet 0     Sig: Take 1 tablet by mouth Daily.       Chelo Saavedra MD  09/11/2024

## 2024-09-11 NOTE — PROGRESS NOTES
September 11, 2024    Hello, may I speak with Ibeth Kiser?    My name is Shonda Sharpe      I am  with [facility]  [address] [place] [address] [phone].    Before we get started may I verify your date of birth? 1955    I am calling to officially welcome you to our practice and ask about your recent visit. Is this a good time to talk? no    Tell me about your visit with us. What things went well?  A My Chart message was sent to the patient.         We're always looking for ways to make our patients' experiences even better. Do you have recommendations on ways we may improve?  no    Overall were you satisfied with your first visit to our practice? yes       I appreciate you taking the time to speak with me today. Is there anything else I can do for you? no      Thank you, and have a great day.

## 2024-09-12 DIAGNOSIS — E11.9 TYPE 2 DIABETES MELLITUS WITHOUT COMPLICATION, WITHOUT LONG-TERM CURRENT USE OF INSULIN: Primary | ICD-10-CM

## 2024-09-12 LAB
25(OH)D3 SERPL-MCNC: 34.1 NG/ML (ref 30–100)
VIT B12 BLD-MCNC: 686 PG/ML (ref 211–946)

## 2024-09-17 ENCOUNTER — TELEPHONE (OUTPATIENT)
Dept: SURGERY | Facility: CLINIC | Age: 69
End: 2024-09-17
Payer: MEDICARE

## 2024-09-24 ENCOUNTER — TELEPHONE (OUTPATIENT)
Dept: SURGERY | Facility: CLINIC | Age: 69
End: 2024-09-24
Payer: MEDICARE

## 2024-10-09 ENCOUNTER — HOSPITAL ENCOUNTER (OUTPATIENT)
Dept: BONE DENSITY | Facility: HOSPITAL | Age: 69
Discharge: HOME | End: 2024-10-09
Admitting: INTERNAL MEDICINE
Payer: MEDICARE

## 2024-10-09 DIAGNOSIS — Z78.0 POST-MENOPAUSAL: ICD-10-CM

## 2024-10-09 PROCEDURE — 77080 DXA BONE DENSITY AXIAL: CPT

## 2024-10-10 ENCOUNTER — OFFICE VISIT (OUTPATIENT)
Dept: ONCOLOGY | Facility: CLINIC | Age: 69
End: 2024-10-10
Payer: MEDICARE

## 2024-10-10 ENCOUNTER — LAB (OUTPATIENT)
Dept: LAB | Facility: HOSPITAL | Age: 69
End: 2024-10-10
Payer: MEDICARE

## 2024-10-10 VITALS
BODY MASS INDEX: 32.44 KG/M2 | OXYGEN SATURATION: 98 % | WEIGHT: 190 LBS | RESPIRATION RATE: 18 BRPM | HEART RATE: 77 BPM | SYSTOLIC BLOOD PRESSURE: 148 MMHG | HEIGHT: 64 IN | DIASTOLIC BLOOD PRESSURE: 65 MMHG | TEMPERATURE: 98 F

## 2024-10-10 DIAGNOSIS — K76.9 LESION OF LIVER: ICD-10-CM

## 2024-10-10 DIAGNOSIS — N60.91 ATYPICAL LOBULAR HYPERPLASIA (ALH) OF RIGHT BREAST: Primary | ICD-10-CM

## 2024-10-10 LAB
BASOPHILS # BLD AUTO: 0.06 10*3/MM3 (ref 0–0.2)
BASOPHILS NFR BLD AUTO: 0.7 % (ref 0–1.5)
DEPRECATED RDW RBC AUTO: 50.3 FL (ref 37–54)
EOSINOPHIL # BLD AUTO: 0.33 10*3/MM3 (ref 0–0.4)
EOSINOPHIL NFR BLD AUTO: 3.6 % (ref 0.3–6.2)
ERYTHROCYTE [DISTWIDTH] IN BLOOD BY AUTOMATED COUNT: 14.6 % (ref 12.3–15.4)
HBA1C MFR BLD: 5.82 % (ref 4.8–5.6)
HCT VFR BLD AUTO: 45.3 % (ref 34–46.6)
HGB BLD-MCNC: 14.5 G/DL (ref 12–15.9)
HOLD SPECIMEN: NORMAL
HOLD SPECIMEN: NORMAL
LYMPHOCYTES # BLD AUTO: 1.45 10*3/MM3 (ref 0.7–3.1)
LYMPHOCYTES NFR BLD AUTO: 15.7 % (ref 19.6–45.3)
MCH RBC QN AUTO: 30.9 PG (ref 26.6–33)
MCHC RBC AUTO-ENTMCNC: 32 G/DL (ref 31.5–35.7)
MCV RBC AUTO: 96.4 FL (ref 79–97)
MONOCYTES # BLD AUTO: 0.96 10*3/MM3 (ref 0.1–0.9)
MONOCYTES NFR BLD AUTO: 10.4 % (ref 5–12)
NEUTROPHILS NFR BLD AUTO: 6.42 10*3/MM3 (ref 1.7–7)
NEUTROPHILS NFR BLD AUTO: 69.6 % (ref 42.7–76)
PLATELET # BLD AUTO: 343 10*3/MM3 (ref 140–450)
PMV BLD AUTO: 10.9 FL (ref 6–12)
RBC # BLD AUTO: 4.7 10*6/MM3 (ref 3.77–5.28)
WBC NRBC COR # BLD AUTO: 9.22 10*3/MM3 (ref 3.4–10.8)
WHOLE BLOOD HOLD COAG: NORMAL

## 2024-10-10 PROCEDURE — 83036 HEMOGLOBIN GLYCOSYLATED A1C: CPT | Performed by: FAMILY MEDICINE

## 2024-10-10 PROCEDURE — 36415 COLL VENOUS BLD VENIPUNCTURE: CPT

## 2024-10-10 PROCEDURE — 85025 COMPLETE CBC W/AUTO DIFF WBC: CPT

## 2024-10-10 NOTE — PROGRESS NOTES
Hematology/Oncology Outpatient Follow Up    PATIENT NAME:Ibeth Kiser  :1955  MRN: [id number]  PRIMARY CARE PHYSICIAN: Chelo Saavedra MD  REFERRING PHYSICIAN: No ref. provider found    Chief Complaint   Patient presents with    Follow-up     Apical lobular hyperplasia right breast        HISTORY OF PRESENT ILLNESS:     This is 68-year-old female referred secondary to atypical lobular hyperplasia of the right breast.  She had bilateral screening mammogram completed 2024, this basically showed asymmetric density within the right subareolar area.  No additional suspicious mass was seen architectural distortion.  Breast arterial calcifications are present.  Spot compression views as well as true lateral view and ultrasound was recommended for further evaluation.     On 2024 she had a right diagnostic mammogram which showed persistent ovoid asymmetry approximately 2 cm above the nipple but no architectural distortion was seen the right breast ultrasound this showed what appears to be a mildly dilated duct containing an echogenic mass measuring 5 x 3 mm.  This may represent an intraductal papilloma.  Debris within the duct could also give the same appearance.    Ultrasound-guided core biopsy was recommended for further evaluation     She had a biopsy of the right breast mass at the 12 o'clock position, this showed focal atypical lobular hyperplasia but no malignancy identified.        2024 patient had bilateral breast MRI there is a biopsy clip in the right breast at the 12 o'clock position but no suspicious mass or non-mass enhancement was identified.  There was no axillary or internal mammary adenopathy.  There was an approximate 1.5 x 1.3 cm area of enhancement noted on the right upper rib which is asymmetric with the left side is incidental and of uncertain etiology CT of the chest was recommended to further evaluate     CT scan of the chest ordered and is pending        There is  no significant family history of cancer     She took HRT for about one year,      She  does not smoke  She drinks occasionally     She is retired      She is single    10/10/2024: Patient was given information to review on Aromasin for chemoprophylaxis but she declines  Past Medical History:   Diagnosis Date    Allergic     Ankle fracture 03/2020    right    Anxiety     Atrial fibrillation     Cancer     skin can- right elbow     Congenital heart failure     Patient states she does not have this    Diabetes mellitus     GERD (gastroesophageal reflux disease)     Hot flashes     Hypertension     Injury of back     Myocardial infarct 2018    Primary biliary cirrhosis 2013    Sleep apnea     CPAP- to bring dos       Past Surgical History:   Procedure Laterality Date    ANKLE OPEN REDUCTION INTERNAL FIXATION Right 03/26/2020    Procedure: ANKLE OPEN REDUCTION INTERNAL FIXATION lateral malleolus with syndesmotic fixation;  Surgeon: Kaleb Contreras MD;  Location: [facility] ER;  Service: Orthopedics;  Laterality: Right;    ANKLE OPEN REDUCTION INTERNAL FIXATION Left 04/20/2023    Procedure: ANKLE OPEN REDUCTION INTERNAL FIXATION;  Surgeon: Juvenal Mcnulty MD;  Location: [facility] OR;  Service: Orthopedics;  Laterality: Left;    AV NODE ABLATION  09/14/2018    BREAST BIOPSY Right 2024    COLONOSCOPY      CYSTOSCOPY, URETEROSCOPY, RETROGRADE PYELOGRAM, STENT INSERTION Right 11/01/2021    Procedure: CYSTOSCOPY, right  URETEROSCOPY, right RETROGRADE PYELOGRAM, balloon dilation of right ureteral stricture, right ureteral stent placement STENT INSERTION;  Surgeon: Chuck Lewis MD;  Location: [facility] OR;  Service: Urology;  Laterality: Right;    ENDOSCOPY      FRACTURE SURGERY      HARDWARE REMOVAL Right 07/28/2020    Procedure: RIGHT ANKLE HARDWARE REMOVAL;  Surgeon: Kaleb Contreras MD;  Location: [facility] OR;  Service: Orthopedics;  Laterality: Right;    LIVER BIOPSY      SKIN BIOPSY      SKIN CANCER  EXCISION  2000    melanoma - right arm - Dr. Mcdaniel    TIBIA IM NAILING/RODDING Left 12/06/2023    Procedure: TIBIA INTRAMEDULLARY NAIL/TOBIN INSERTION;  Surgeon: Luigi Alvarado MD;  Location: [facility] MAIN OR;  Service: Orthopedics;  Laterality: Left;    UPPER ENDOSCOPIC ULTRASOUND W/ FNA N/A 02/15/2022    Procedure: EUS GUIDED LIVER BX;  Surgeon: Sarina Jarquin MD;  Location: [facility] ENDOSCOPY;  Service: Gastroenterology;  Laterality: N/A;  esophagitis, hiatal hernia         Current Outpatient Medications:     Accu-Chek FastClix Lancets misc, USE AS DIRECTED TO TEST BLOOD SUGAR ONCE DAILY, Disp: 102 each, Rfl: 0    amoxicillin (AMOXIL) 875 MG tablet, TAKE 1 TABLET BY MOUTH TWICE DAILY UNTIL ALL TAKEN, Disp: , Rfl:     apixaban (ELIQUIS) 5 MG tablet tablet, Take 1 tablet by mouth Every 12 (Twelve) Hours., Disp: 180 tablet, Rfl: 4    atorvastatin (LIPITOR) 20 MG tablet, Take 1 tablet by mouth Daily., Disp: 90 tablet, Rfl: 0    Blood Glucose Monitoring Suppl (ACCU-CHEK LAKEISHA) device, Use as instructed to check blood sugar, Disp: 1 each, Rfl: 0    cefdinir (OMNICEF) 300 MG capsule, Take 1 capsule by mouth 2 (Two) Times a Day., Disp: 10 capsule, Rfl: 0    dilTIAZem CD (CARDIZEM CD) 240 MG 24 hr capsule, TAKE 1 CAPSULE BY MOUTH EVERY MORNING, Disp: 90 capsule, Rfl: 3    escitalopram (LEXAPRO) 20 MG tablet, TAKE 1 TABLET BY MOUTH DAILY, Disp: 90 tablet, Rfl: 1    furosemide (LASIX) 20 MG tablet, Take 1 tablet by mouth Daily., Disp: 90 tablet, Rfl: 3    glucose blood (Accu-Chek Lakeisha) test strip, Use as instructed to check blood sugar once a day, Disp: 100 each, Rfl: 1    HYDROcodone-acetaminophen (NORCO) 5-325 MG per tablet, Take 1 tablet by mouth Every 6 (Six) Hours As Needed for Moderate Pain or Severe Pain., Disp: 12 tablet, Rfl: 0    meloxicam (MOBIC) 7.5 MG tablet, Take 1 tablet by mouth Daily As Needed for Moderate Pain., Disp: 30 tablet, Rfl: 4    methocarbamol (ROBAXIN) 500 MG tablet, Take 1 tablet by mouth 3  (Three) Times a Day As Needed for Muscle Spasms., Disp: 90 tablet, Rfl: 0    metoprolol succinate XL (TOPROL-XL) 200 MG 24 hr tablet, TAKE 1 TABLET BY MOUTH EVERY DAY, Disp: 100 tablet, Rfl: 3    Mounjaro 7.5 MG/0.5ML solution pen-injector pen, Inject 0.5 mL under the skin into the appropriate area as directed 1 (One) Time Per Week., Disp: 2 mL, Rfl: 2    OCALIVA 5 MG tablet, Take 1 tablet by mouth Daily. 2 pm (test drug) to reduce LE's, Disp: , Rfl:     ondansetron ODT (ZOFRAN-ODT) 8 MG disintegrating tablet, Place 1 tablet on the tongue Every 8 (Eight) Hours As Needed for Nausea or Vomiting., Disp: 10 tablet, Rfl: 0    pantoprazole (Protonix) 20 MG EC tablet, Take 1 tablet by mouth Daily. Take day of surgery, Disp: 90 tablet, Rfl: 1    ursodiol (ACTIGALL) 500 MG tablet, Take 1 tablet by mouth 3 (Three) Times a Day., Disp: , Rfl:     Vibegron (Gemtesa) 75 MG tablet, Take 1 tablet by mouth Daily., Disp: 30 tablet, Rfl: 2    Allergies   Allergen Reactions    Codeine Anxiety    Lisinopril Hives    Kiwi Extract Swelling       Family History   Problem Relation Age of Onset    Multiple myeloma Mother     Heart disease Mother        Cancer-related family history is not on file.    Social History     Tobacco Use    Smoking status: Never     Passive exposure: Never    Smokeless tobacco: Never   Vaping Use    Vaping status: Never Used   Substance Use Topics    Alcohol use: Not Currently     Alcohol/week: 2.0 standard drinks of alcohol     Types: 2 Glasses of wine per week     Comment: s0cial    Drug use: Never       I have reviewed and confirmed the accuracy of the patient's history: Chief complaint, HPI, ROS, and Subjective as entered by the MA/LPN/RN. Paulanirali Saab MD 10/10/24      SUBJECTIVE:     Here today to have further discussions regarding management of atypical lobular hyperplasia    REVIEW OF SYSTEMS:  Review of Systems   Constitutional:  Negative for chills, fatigue and fever.   HENT:  Negative for  "congestion, drooling, ear discharge, rhinorrhea, sinus pressure and tinnitus.    Eyes:  Negative for photophobia, pain and discharge.   Respiratory:  Negative for apnea, choking and stridor.    Cardiovascular:  Negative for palpitations.   Gastrointestinal:  Negative for abdominal distention, abdominal pain and anal bleeding.   Endocrine: Negative for polydipsia and polyphagia.   Genitourinary:  Negative for decreased urine volume, flank pain and genital sores.   Musculoskeletal:  Negative for gait problem, neck pain and neck stiffness.   Skin:  Negative for color change, rash and wound.   Neurological:  Negative for tremors, seizures, syncope, facial asymmetry and speech difficulty.   Hematological:  Negative for adenopathy.   Psychiatric/Behavioral:  Negative for agitation, confusion, hallucinations and self-injury. The patient is not hyperactive.        OBJECTIVE:    Vitals:    10/10/24 1306   BP: 148/65   Pulse: 77   Resp: 18   Temp: 98 °F (36.7 °C)   TempSrc: Infrared   SpO2: 98%   Weight: 86.2 kg (190 lb)   Height: 162.6 cm (64\")   PainSc: 0-No pain     Body mass index is 32.61 kg/m².    ECOG  (0) Fully active, able to carry on all predisease performance without restriction    Physical Exam    No changes    RECENT LABS    WBC   Date Value Ref Range Status   10/10/2024 9.22 3.40 - 10.80 10*3/mm3 Final     RBC   Date Value Ref Range Status   10/10/2024 4.70 3.77 - 5.28 10*6/mm3 Final     Hemoglobin   Date Value Ref Range Status   10/10/2024 14.5 12.0 - 15.9 g/dL Final     Hematocrit   Date Value Ref Range Status   10/10/2024 45.3 34.0 - 46.6 % Final     MCV   Date Value Ref Range Status   10/10/2024 96.4 79.0 - 97.0 fL Final     MCH   Date Value Ref Range Status   10/10/2024 30.9 26.6 - 33.0 pg Final     MCHC   Date Value Ref Range Status   10/10/2024 32.0 31.5 - 35.7 g/dL Final     RDW   Date Value Ref Range Status   10/10/2024 14.6 12.3 - 15.4 % Final     RDW-SD   Date Value Ref Range Status   10/10/2024 50.3 " 37.0 - 54.0 fl Final     MPV   Date Value Ref Range Status   10/10/2024 10.9 6.0 - 12.0 fL Final     Platelets   Date Value Ref Range Status   10/10/2024 343 140 - 450 10*3/mm3 Final     Neutrophil %   Date Value Ref Range Status   10/10/2024 69.6 42.7 - 76.0 % Final     Lymphocyte %   Date Value Ref Range Status   10/10/2024 15.7 (L) 19.6 - 45.3 % Final     Monocyte %   Date Value Ref Range Status   10/10/2024 10.4 5.0 - 12.0 % Final     Eosinophil %   Date Value Ref Range Status   10/10/2024 3.6 0.3 - 6.2 % Final     Basophil %   Date Value Ref Range Status   10/10/2024 0.7 0.0 - 1.5 % Final     Immature Grans %   Date Value Ref Range Status   08/20/2024 0.4 0.0 - 0.5 % Final     Neutrophils, Absolute   Date Value Ref Range Status   10/10/2024 6.42 1.70 - 7.00 10*3/mm3 Final     Lymphocytes, Absolute   Date Value Ref Range Status   10/10/2024 1.45 0.70 - 3.10 10*3/mm3 Final     Monocytes, Absolute   Date Value Ref Range Status   10/10/2024 0.96 (H) 0.10 - 0.90 10*3/mm3 Final     Eosinophils, Absolute   Date Value Ref Range Status   10/10/2024 0.33 0.00 - 0.40 10*3/mm3 Final     Basophils, Absolute   Date Value Ref Range Status   10/10/2024 0.06 0.00 - 0.20 10*3/mm3 Final     Immature Grans, Absolute   Date Value Ref Range Status   08/20/2024 0.05 0.00 - 0.05 10*3/mm3 Final     nRBC   Date Value Ref Range Status   08/20/2024 0.0 0.0 - 0.2 /100 WBC Final       Lab Results   Component Value Date    GLUCOSE 107 (H) 09/09/2024    BUN 23 09/09/2024    CREATININE 0.70 09/09/2024    EGFRIFNONA 99 11/01/2021    BCR 32.9 (H) 09/09/2024    K 3.7 09/09/2024    CO2 28.0 09/09/2024    CALCIUM 9.6 09/09/2024    ALBUMIN 4.2 09/09/2024    LABIL2 1.2 09/14/2018    AST 39 (H) 09/09/2024    ALT 33 09/09/2024         Assessment & Plan     Atypical lobular hyperplasia (ALH) of right breast  - CBC & Differential  - Ambulatory Referral to General Surgery    Lesion of liver  - CBC & Differential      ASSESSMENT:    Atypical lobular  hyperplasia (ALH) of right breast  - CBC & Differential     Lesion of liver  - CBC & Differential        Apical lobular hyperplasia right breast, status post postbiopsy:  Patient was seen by Dr. Bryant, surgical resection not recommended for follow-up in 6 months  Rib lesion, CT of the chest showed rib fractures.  Patient follows up with her PCP.  A-fib: On Eliquis.  Status post cardioversion.  Patient is established with Dr. Thibodeaux  Osteopenia: Patient to begin calcium vitamin D and follow-up with her PCP                     Plans;     Discussed atypical lobular hyperplasia and its relationship to increased risk for breast cancer.  Discussed chemoprophylaxis with patient.  Aromatase inhibitor has been recommended for her due to history of A-fib, on anticoagulation and risk of strokes.  We will therefore avoid tamoxifen.  Patient has declined chemoprophylaxis due to concern for potential side effects  Discussed the side effects and benefits of aromatase inhibitor in detail with patient including but not limited to:  Side effects of aromatase inhibitors include risk of musculoskeletal side effect including arthralgia, myalgia, especially in patients who have underlying musculoskeletal disease such as osteoarthritis, hot flashes, mood changes. There is risk of vaginal dryness, dyspareunia and other sexual dysfunction. Other side effects include degree of cognitive symptoms compared to women not on endocrine therapy. There is possibility of fatigue, forgetfulness, poor sleep hygiene, osteoporosis, osteopenia, risk of fractures, cardiovascular disease and hypercholestolemia. There is also possibility of reactivation of ovarian function especially in women who were premenopausal at time of diagnosis and became amenorrheic while on chemotherapy. The duration of treatment is also for minimum of five years and possibly extending therapy in some women with higher risk features beyond five years. We also discussed that the  AI have similar efficacy in the adjuvant setting. Therefore, no one AI is preferred over another. I will obtain a bone density at baseline if none has been done and every two years after that. If there is evidence of osteopenia or osteoporosis, there will be recommendation for bisphosphonate therapy for the duration of aromatase inhibitor therapy and possibly longer depending on bone health status at completion of therapy. We also discussed that labs will be done with follow ups and lipid panel will be done on an annual basis.   Patient wants to have surgical excision of lesion therefore referred her to Dr. Murray  Follow-up January 2025  Discussed surveillance imaging with patient.    All questions answered           I spent 30 total minutes, face-to-face, caring for Ibeth today. 90% of this time involved counseling and/or coordination of care as documented within this note.

## 2024-10-15 ENCOUNTER — OFFICE VISIT (OUTPATIENT)
Age: 69
End: 2024-10-15
Payer: MEDICARE

## 2024-10-15 VITALS
TEMPERATURE: 97.3 F | SYSTOLIC BLOOD PRESSURE: 181 MMHG | BODY MASS INDEX: 33.16 KG/M2 | DIASTOLIC BLOOD PRESSURE: 86 MMHG | WEIGHT: 194.2 LBS | HEART RATE: 60 BPM | RESPIRATION RATE: 18 BRPM | HEIGHT: 64 IN | OXYGEN SATURATION: 100 %

## 2024-10-15 DIAGNOSIS — N60.91 ATYPICAL LOBULAR HYPERPLASIA (ALH) OF RIGHT BREAST: Primary | ICD-10-CM

## 2024-10-15 PROCEDURE — 1160F RVW MEDS BY RX/DR IN RCRD: CPT | Performed by: SURGERY

## 2024-10-15 PROCEDURE — 3077F SYST BP >= 140 MM HG: CPT | Performed by: SURGERY

## 2024-10-15 PROCEDURE — 3079F DIAST BP 80-89 MM HG: CPT | Performed by: SURGERY

## 2024-10-15 PROCEDURE — 1159F MED LIST DOCD IN RCRD: CPT | Performed by: SURGERY

## 2024-10-15 PROCEDURE — 99214 OFFICE O/P EST MOD 30 MIN: CPT | Performed by: SURGERY

## 2024-10-15 NOTE — PROGRESS NOTES
New Patient Consultation    REFERRING PHYSICIAN:  Paula Saab MD  [address] [facility]  [address]  [facility]  [place] [id number]    MEDICAL ONCOLOGIST: Paula Saab MD      HISTORY OF PRESENT ILLNESS  Clinical Presentation:    This is a 68 y.o. female who presents with atypical lobular hyperplasia in the right breast.    She underwent a bilateral screening mammogram on 2024 which showed asymmetry in the right subareolar lesion with no other suspicious findings.  She then underwent a diagnostic mammogram on 2024 which redemonstrated the mass at 2 cm above the nipple at the 12 o'clock position and appeared was suspicious for a papilloma and a mildly dilated duct measuring 5 x 3 mm.  She then underwent an ultrasound-guided biopsy of this mass which showed focal atypical lobular hyperplasia but no malignancy on 2024.  She then underwent additional imaging with a bilateral MRI breast 3 cm with no signs of malignancy in either breast and an incidental finding of an area of nodular enhancement within an anterior right rib near the costochondral junction which was asymmetric with the other side.    She was initially seen by Dr. Saab on 2024 and discussed the finding of atypical lobular hyperplasia as a high risk lesion as well as chemoprophylaxis with an aromatase inhibitor.  She then followed up with Dr. Saab on 10/10/2024 to further discuss chemoprophylaxis but declined.  She requested referral to breast surgery to discuss surgical excision of the lesion.    Her medical history is significant for A-fib on chronic anticoagulation with Eliquis ([id number] Score: 6) treated with ablation in 2018, and BEVERLY on CPAP.  She does have a personal history of melanoma on the right elbow that has been excised.      REPRODUCTIVE HISTORY:   . P: 1. AB: 0.  Last menstrual period: age 52, postmenopausal  Age at menarche: 17  Age at first childbirth: 21  Lactation/How long: no  Age at menopause: 52  Total  years of oral contraceptive use: 30  Total years of hormone replacement therapy: 2 (unknown, but like estrogen based)      PAST MEDICAL HISTORY:  Past Medical History:   Diagnosis Date    Allergic     Ankle fracture 03/2020    right    Anxiety     Atrial fibrillation     Cancer     skin can- right elbow     Congenital heart failure     Patient states she does not have this    Diabetes mellitus     GERD (gastroesophageal reflux disease)     Hot flashes     Hypertension     Injury of back     Myocardial infarct 2018    Primary biliary cirrhosis 2013    Sleep apnea     CPAP- to bring dos       PAST SURGICAL HISTORY:  Past Surgical History:   Procedure Laterality Date    ANKLE OPEN REDUCTION INTERNAL FIXATION Right 03/26/2020    Procedure: ANKLE OPEN REDUCTION INTERNAL FIXATION lateral malleolus with syndesmotic fixation;  Surgeon: Kaleb Contreras MD;  Location: [facility] MAIN OR;  Service: Orthopedics;  Laterality: Right;    ANKLE OPEN REDUCTION INTERNAL FIXATION Left 04/20/2023    Procedure: ANKLE OPEN REDUCTION INTERNAL FIXATION;  Surgeon: Juvenal Mcnulty MD;  Location: [facility] MAIN OR;  Service: Orthopedics;  Laterality: Left;    AV NODE ABLATION  09/14/2018    BREAST BIOPSY Right 2024    COLONOSCOPY      CYSTOSCOPY, URETEROSCOPY, RETROGRADE PYELOGRAM, STENT INSERTION Right 11/01/2021    Procedure: CYSTOSCOPY, right  URETEROSCOPY, right RETROGRADE PYELOGRAM, balloon dilation of right ureteral stricture, right ureteral stent placement STENT INSERTION;  Surgeon: Chuck Lewis MD;  Location: [facility] MAIN OR;  Service: Urology;  Laterality: Right;    ENDOSCOPY      FRACTURE SURGERY      HARDWARE REMOVAL Right 07/28/2020    Procedure: RIGHT ANKLE HARDWARE REMOVAL;  Surgeon: Kaleb Contreras MD;  Location: [facility] OR;  Service: Orthopedics;  Laterality: Right;    LIVER BIOPSY      SKIN BIOPSY      SKIN CANCER EXCISION  2000    melanoma - right arm - Dr. Mcdaniel    TIBIA IM NAILING/RODDING Left 12/06/2023    Procedure:  TIBIA INTRAMEDULLARY NAIL/TOBIN INSERTION;  Surgeon: Luigi Alvarado MD;  Location: [facility] MAIN OR;  Service: Orthopedics;  Laterality: Left;    UPPER ENDOSCOPIC ULTRASOUND W/ FNA N/A 02/15/2022    Procedure: EUS GUIDED LIVER BX;  Surgeon: Sarina Jarquin MD;  Location: [facility] ENDOSCOPY;  Service: Gastroenterology;  Laterality: N/A;  esophagitis, hiatal hernia       She denies any issues with general anesthesia.    Family History:  Family History   Problem Relation Age of Onset    Multiple myeloma Mother     Heart disease Mother        She denies any family history of breast cancer, colon cancer, prostate cancer, ovarian cancer, or melanoma.    She is not of Ashkenazi Anabaptism descent.  She has never been genetically tested.    SOCIAL HISTORY:  Social History     Tobacco Use    Smoking status: Never     Passive exposure: Never    Smokeless tobacco: Never   Vaping Use    Vaping status: Never Used   Substance Use Topics    Alcohol use: Not Currently     Alcohol/week: 2.0 standard drinks of alcohol     Types: 2 Glasses of wine per week     Comment: s0cial    Drug use: Never       Patient denies any smoking, significant alcohol use, or drug use.    Medications:   Outpatient Encounter Medications as of 10/15/2024   Medication Sig Dispense Refill    Accu-Chek FastClix Lancets misc USE AS DIRECTED TO TEST BLOOD SUGAR ONCE DAILY 102 each 0    amoxicillin (AMOXIL) 875 MG tablet TAKE 1 TABLET BY MOUTH TWICE DAILY UNTIL ALL TAKEN      apixaban (ELIQUIS) 5 MG tablet tablet Take 1 tablet by mouth Every 12 (Twelve) Hours. 180 tablet 4    atorvastatin (LIPITOR) 20 MG tablet Take 1 tablet by mouth Daily. 90 tablet 0    Blood Glucose Monitoring Suppl (ACCU-CHEK KRYSTEN) device Use as instructed to check blood sugar 1 each 0    cefdinir (OMNICEF) 300 MG capsule Take 1 capsule by mouth 2 (Two) Times a Day. 10 capsule 0    dilTIAZem CD (CARDIZEM CD) 240 MG 24 hr capsule TAKE 1 CAPSULE BY MOUTH EVERY MORNING 90 capsule 3     escitalopram (LEXAPRO) 20 MG tablet TAKE 1 TABLET BY MOUTH DAILY 90 tablet 1    furosemide (LASIX) 20 MG tablet Take 1 tablet by mouth Daily. 90 tablet 3    glucose blood (Accu-Chek Lakeisha) test strip Use as instructed to check blood sugar once a day 100 each 1    HYDROcodone-acetaminophen (NORCO) 5-325 MG per tablet Take 1 tablet by mouth Every 6 (Six) Hours As Needed for Moderate Pain or Severe Pain. 12 tablet 0    meloxicam (MOBIC) 7.5 MG tablet Take 1 tablet by mouth Daily As Needed for Moderate Pain. 30 tablet 4    methocarbamol (ROBAXIN) 500 MG tablet Take 1 tablet by mouth 3 (Three) Times a Day As Needed for Muscle Spasms. 90 tablet 0    metoprolol succinate XL (TOPROL-XL) 200 MG 24 hr tablet TAKE 1 TABLET BY MOUTH EVERY  tablet 3    Mounjaro 7.5 MG/0.5ML solution pen-injector pen Inject 0.5 mL under the skin into the appropriate area as directed 1 (One) Time Per Week. 2 mL 2    OCALIVA 5 MG tablet Take 1 tablet by mouth Daily. 2 pm (test drug) to reduce LE's      ondansetron ODT (ZOFRAN-ODT) 8 MG disintegrating tablet Place 1 tablet on the tongue Every 8 (Eight) Hours As Needed for Nausea or Vomiting. 10 tablet 0    pantoprazole (Protonix) 20 MG EC tablet Take 1 tablet by mouth Daily. Take day of surgery 90 tablet 1    ursodiol (ACTIGALL) 500 MG tablet Take 1 tablet by mouth 3 (Three) Times a Day.      Vibegron (Gemtesa) 75 MG tablet Take 1 tablet by mouth Daily. 30 tablet 2     No facility-administered encounter medications on file as of 10/15/2024.        Allergies:   Allergies   Allergen Reactions    Codeine Anxiety    Lisinopril Hives    Kiwi Extract Swelling        Review of systems: A 14 point review of systems was obtained and was negative    PHYSICAL EXAM     There were no vitals taken for this visit.    General appearance:alert, appears stated age, and cooperative  Cardiac: normal rate and regular rhythm, well perfused. No significant peripheral edema.  Respiratory: clear to auscultation  bilaterally, equal bilateral chest rise with normal effort on room air  Breast Exam:  Bilateral breast exam performed seated and supine.  The bilateral breasts are symmetric and do not have any masses, skin changes, nipple changes, or nipple discharge.  In the right breast her biopsy site is completely healed and there is no palpable hematoma.  Bilaterally, the axillary, supraclavicular, and cervical lymph node basins are without lymphadenopathy.     Patient exam or treatment required medical chaperone.  The sensitive parts of the examination were performed with chaperone present: Carol Ang MA    IMAGING:  I personally reviewed the patient's imaging and my interpretation of her screening mammogram, diagnostic mammogram and ultrasound, and postbiopsy mammogram is an upper subareolar right breast asymmetry redemonstrated on diagnostic mammography with the ultrasound showing a small 5 x 3 mm mass and good position of the postbiopsy clip.      PATHOLOGY: Her pathology report was reviewed with her in detail    Surgical Pathology Report                         Case: [id number]                                   Authorizing Provider:  Giovanni Cruz MD       Collected:           07/31/2024 01:50 PM           Ordering Location:     [facility]       Received:            07/31/2024 02:21 PM                                  LABORATORY                                                                   Pathologist:           Ramon Ahmadi MD                                                             Specimen:    Breast, Right, 12:00 2cmFN                                                                Final Diagnosis   Mass, right breast 12:00, biopsies:  Benign breast tissue with stromal fibrosis and adenosis  Focal atypical lobular hyperplasia  No malignancy identified, see comment     ROSALIA   Electronically signed by Ramon Ahmadi MD on 8/1/2024 at 1541   Comment    Clinical correlation is recommended sure  the biopsies are entirely representative of the targeted lesion.  ROSALIA   Gross Description    1. Breast, Right.  Received in formalin designated right breast 12:00 are a few fragments of yellowish fatty tissue and reddish blood clot measuring 2 cm in greatest aggregate dimension.  Submitted in 1 cassette.  Total time in formalin: 6 hours.  ROSALIA       Assessment:  This is a 68 y.o. female with a small 3 x 5 mm right breast lesion with pathology showing benign breast tissue with stromal fibrosis and adenosis as well as some focal atypical lobular hyperplasia (ALH).    Plan:  - Plan for continued high risk screening with Dr. Saab with alternating MRI and mammogram with ultrasound.  - No surgical excision of the site at this time.  Given the size of the lesion on ultrasound it was likely removed in its entirety with the core needle biopsy.  - I encouraged her to follow-up as needed or with any new questions or concerns    In reviewing her pathology and imaging reports, we discussed that traditionally if ALH was found on a core biopsy a further excisional biopsy was recommended due to concern for the possibility of upgrade to DCIS or invasive carcinoma in the vicinity of the lesion.  More recent studies have shown that with an adequate biopsy with a large core needle and clear radiologic pathologic concordance and no other concerning features, the risk of upgrade is exceedingly low.  Therefore in these cases routine excision is not necessarily and surveillance is a reasonable option.  We discussed that excision could be an option for peace of mind or a palpable mass although would confer the risks of surgery which for Ms. Verduzco would include holding her anticoagulation and all of the cardiac risks associated with anesthesia.    We discuss that while ALH is not thought to be a premalignant lesion, it is a high risk lesion which increases her lifetime risk of breast cancer in either breast.  Using the [employer]  risk calculator, I estimate her personal 10-year risk of breast cancer at 15% and lifetime risk of 27.9% (greater than the population risk of 3.5 and 7% respectively).    Options for managing individuals at increased risk of breast cancer include high risk screening with an annual mammogram and annual breast MRI alternating every 6 months and chemoprevention with tamoxifen or aromatase inhibitor both of which she is discussed with Dr. Saab.  She is not interested in chemoprevention at this time, but is interested in high risk screening.  At this time any prophylactic risk reducing surgery would be a relatively radical strategy typically is reserved for patients with a genetic mutation conferring a higher lifetime risk.  Finally we discussed the importance of continuing to avoid tobacco use, eating a healthy plant-based diet and minimizing red meats, and continued exercise to avoid excess body fat or weight gain after menopause.      BMI is >= 30 and <35. (Class 1 Obesity). The following options were offered after discussion;: exercise counseling/recommendations and nutrition counseling/recommendations           Signed:    Chidi Arizmendi MD  Breast Surgical Oncology  [facility]

## 2024-10-15 NOTE — LETTER
October 15, 2024     Paula Saab MD  [address] [facility] [address]  [place] [address]    Patient: Ibeth Kiser   YOB: 1955   Date of Visit: 10/15/2024     Dear Paula Saab MD:       Thank you for referring Ibeth Kiser to me for evaluation. Below are the relevant portions of my assessment and plan of care.    If you have questions, please do not hesitate to call me. I look forward to following Ibeth along with you.         Sincerely,        Chidi Arizmendi MD        CC: MD Ugo Tsang, Chidi Matthews MD  10/15/24 1524  Sign when Signing Visit  New Patient Consultation    REFERRING PHYSICIAN:  Paula Saab MD  [address] [facility] [address]  [place] [address]    MEDICAL ONCOLOGIST: Paula Saab MD      HISTORY OF PRESENT ILLNESS  Clinical Presentation:    This is a 68 y.o. female who presents with atypical lobular hyperplasia in the right breast.    She underwent a bilateral screening mammogram on 7/8/2024 which showed asymmetry in the right subareolar lesion with no other suspicious findings.  She then underwent a diagnostic mammogram on 7/25/2024 which redemonstrated the mass at 2 cm above the nipple at the 12 o'clock position and appeared was suspicious for a papilloma and a mildly dilated duct measuring 5 x 3 mm.  She then underwent an ultrasound-guided biopsy of this mass which showed focal atypical lobular hyperplasia but no malignancy on 7/31/2024.  She then underwent additional imaging with a bilateral MRI breast 3 cm with no signs of malignancy in either breast and an incidental finding of an area of nodular enhancement within an anterior right rib near the costochondral junction which was asymmetric with the other side.    She was initially seen by Dr. Saab on 9/9/2024 and discussed the finding of atypical lobular hyperplasia as a high risk lesion as well as chemoprophylaxis with an aromatase inhibitor.  She then  followed up with Dr. Saab on 10/10/2024 to further discuss chemoprophylaxis but declined.  She requested referral to breast surgery to discuss surgical excision of the lesion.    Her medical history is significant for A-fib on chronic anticoagulation with Eliquis ([id number] Score: 6) treated with ablation in , and [place] on CPAP.  She does have a personal history of melanoma on the right elbow that has been excised.      REPRODUCTIVE HISTORY:   . P: 1. AB: 0.  Last menstrual period: age 52, postmenopausal  Age at menarche: 17  Age at first childbirth: 21  Lactation/How long: no  Age at menopause: 52  Total years of oral contraceptive use: 30  Total years of hormone replacement therapy: 2 (unknown, but like estrogen based)      PAST MEDICAL HISTORY:  Past Medical History:   Diagnosis Date   • Allergic    • Ankle fracture 2020    right   • Anxiety    • Atrial fibrillation    • Cancer     skin can- right elbow    • Congenital heart failure     Patient states she does not have this   • Diabetes mellitus    • GERD (gastroesophageal reflux disease)    • Hot flashes    • Hypertension    • Injury of back    • Myocardial infarct    • Primary biliary cirrhosis    • Sleep apnea     CPAP- to bring dos       PAST SURGICAL HISTORY:  Past Surgical History:   Procedure Laterality Date   • ANKLE OPEN REDUCTION INTERNAL FIXATION Right 2020    Procedure: ANKLE OPEN REDUCTION INTERNAL FIXATION lateral malleolus with syndesmotic fixation;  Surgeon: Kaleb Contreras MD;  Location: [facility] OR;  Service: Orthopedics;  Laterality: Right;   • ANKLE OPEN REDUCTION INTERNAL FIXATION Left 2023    Procedure: ANKLE OPEN REDUCTION INTERNAL FIXATION;  Surgeon: Juvenal Mcnulty MD;  Location: [facility] OR;  Service: Orthopedics;  Laterality: Left;   • AV NODE ABLATION  2018   • BREAST BIOPSY Right    • COLONOSCOPY     • CYSTOSCOPY, URETEROSCOPY, RETROGRADE PYELOGRAM, STENT INSERTION Right 2021     Procedure: CYSTOSCOPY, right  URETEROSCOPY, right RETROGRADE PYELOGRAM, balloon dilation of right ureteral stricture, right ureteral stent placement STENT INSERTION;  Surgeon: Chuck Lewis MD;  Location: [facility] MAIN OR;  Service: Urology;  Laterality: Right;   • ENDOSCOPY     • FRACTURE SURGERY     • HARDWARE REMOVAL Right 07/28/2020    Procedure: RIGHT ANKLE HARDWARE REMOVAL;  Surgeon: Kaleb Contreras MD;  Location: [facility] MAIN OR;  Service: Orthopedics;  Laterality: Right;   • LIVER BIOPSY     • SKIN BIOPSY     • SKIN CANCER EXCISION  2000    melanoma - right arm - Dr. Mcdaniel   • TIBIA IM NAILING/RODDING Left 12/06/2023    Procedure: TIBIA INTRAMEDULLARY NAIL/TOBIN INSERTION;  Surgeon: Luigi Alvarado MD;  Location: [facility] MAIN OR;  Service: Orthopedics;  Laterality: Left;   • UPPER ENDOSCOPIC ULTRASOUND W/ FNA N/A 02/15/2022    Procedure: EUS GUIDED LIVER BX;  Surgeon: Sarina Jarquin MD;  Location: [facility] ENDOSCOPY;  Service: Gastroenterology;  Laterality: N/A;  esophagitis, hiatal hernia       She denies any issues with general anesthesia.    Family History:  Family History   Problem Relation Age of Onset   • Multiple myeloma Mother    • Heart disease Mother        She denies any family history of breast cancer, colon cancer, prostate cancer, ovarian cancer, or melanoma.    She is not of Ashkenazi Religion descent.  She has never been genetically tested.    SOCIAL HISTORY:  Social History     Tobacco Use   • Smoking status: Never     Passive exposure: Never   • Smokeless tobacco: Never   Vaping Use   • Vaping status: Never Used   Substance Use Topics   • Alcohol use: Not Currently     Alcohol/week: 2.0 standard drinks of alcohol     Types: 2 Glasses of wine per week     Comment: s0cial   • Drug use: Never       Patient denies any smoking, significant alcohol use, or drug use.    Medications:   Outpatient Encounter Medications as of 10/15/2024   Medication Sig Dispense Refill   • Accu-Chek FastClix  Lancets misc USE AS DIRECTED TO TEST BLOOD SUGAR ONCE DAILY 102 each 0   • amoxicillin (AMOXIL) 875 MG tablet TAKE 1 TABLET BY MOUTH TWICE DAILY UNTIL ALL TAKEN     • apixaban (ELIQUIS) 5 MG tablet tablet Take 1 tablet by mouth Every 12 (Twelve) Hours. 180 tablet 4   • atorvastatin (LIPITOR) 20 MG tablet Take 1 tablet by mouth Daily. 90 tablet 0   • Blood Glucose Monitoring Suppl (ACCU-CHEK KRYSTEN) device Use as instructed to check blood sugar 1 each 0   • cefdinir (OMNICEF) 300 MG capsule Take 1 capsule by mouth 2 (Two) Times a Day. 10 capsule 0   • dilTIAZem CD (CARDIZEM CD) 240 MG 24 hr capsule TAKE 1 CAPSULE BY MOUTH EVERY MORNING 90 capsule 3   • escitalopram (LEXAPRO) 20 MG tablet TAKE 1 TABLET BY MOUTH DAILY 90 tablet 1   • furosemide (LASIX) 20 MG tablet Take 1 tablet by mouth Daily. 90 tablet 3   • glucose blood (Accu-Chek Krysten) test strip Use as instructed to check blood sugar once a day 100 each 1   • HYDROcodone-acetaminophen (NORCO) 5-325 MG per tablet Take 1 tablet by mouth Every 6 (Six) Hours As Needed for Moderate Pain or Severe Pain. 12 tablet 0   • meloxicam (MOBIC) 7.5 MG tablet Take 1 tablet by mouth Daily As Needed for Moderate Pain. 30 tablet 4   • methocarbamol (ROBAXIN) 500 MG tablet Take 1 tablet by mouth 3 (Three) Times a Day As Needed for Muscle Spasms. 90 tablet 0   • metoprolol succinate XL (TOPROL-XL) 200 MG 24 hr tablet TAKE 1 TABLET BY MOUTH EVERY  tablet 3   • Mounjaro 7.5 MG/0.5ML solution pen-injector pen Inject 0.5 mL under the skin into the appropriate area as directed 1 (One) Time Per Week. 2 mL 2   • OCALIVA 5 MG tablet Take 1 tablet by mouth Daily. 2 pm (test drug) to reduce LE's     • ondansetron ODT (ZOFRAN-ODT) 8 MG disintegrating tablet Place 1 tablet on the tongue Every 8 (Eight) Hours As Needed for Nausea or Vomiting. 10 tablet 0   • pantoprazole (Protonix) 20 MG EC tablet Take 1 tablet by mouth Daily. Take day of surgery 90 tablet 1   • ursodiol (ACTIGALL) 500 MG  tablet Take 1 tablet by mouth 3 (Three) Times a Day.     • Vibegron (Gemtesa) 75 MG tablet Take 1 tablet by mouth Daily. 30 tablet 2     No facility-administered encounter medications on file as of 10/15/2024.        Allergies:   Allergies   Allergen Reactions   • Codeine Anxiety   • Lisinopril Hives   • Kiwi Extract Swelling        Review of systems: A 14 point review of systems was obtained and was negative    PHYSICAL EXAM     There were no vitals taken for this visit.    General appearance:alert, appears stated age, and cooperative  Cardiac: normal rate and regular rhythm, well perfused. No significant peripheral edema.  Respiratory: clear to auscultation bilaterally, equal bilateral chest rise with normal effort on room air  Breast Exam:  Bilateral breast exam performed seated and supine.  The bilateral breasts are symmetric and do not have any masses, skin changes, nipple changes, or nipple discharge.  In the right breast her biopsy site is completely healed and there is no palpable hematoma.  Bilaterally, the axillary, supraclavicular, and cervical lymph node basins are without lymphadenopathy.     Patient exam or treatment required medical chaperone.  The sensitive parts of the examination were performed with chaperone present: Carol Ang MA    IMAGING:  I personally reviewed the patient's imaging and my interpretation of her screening mammogram, diagnostic mammogram and ultrasound, and postbiopsy mammogram is an upper subareolar right breast asymmetry redemonstrated on diagnostic mammography with the ultrasound showing a small 5 x 3 mm mass and good position of the postbiopsy clip.      PATHOLOGY: Her pathology report was reviewed with her in detail    Surgical Pathology Report                         Case: [id number]                                   Authorizing Provider:  Giovanni Cruz MD       Collected:           07/31/2024 01:50 PM           Ordering Location:     [facility]        Received:            07/31/2024 02:21 PM                                  LABORATORY                                                                   Pathologist:           Ramon Ahmadi MD                                                             Specimen:    Breast, Right, 12:00 2cmFN                                                                Final Diagnosis   Mass, right breast 12:00, biopsies:  Benign breast tissue with stromal fibrosis and adenosis  Focal atypical lobular hyperplasia  No malignancy identified, see comment     ROSALIA   Electronically signed by Ramon Ahmadi MD on 8/1/2024 at 1541   Comment    Clinical correlation is recommended sure the biopsies are entirely representative of the targeted lesion.  ROSALIA   Gross Description    1. Breast, Right.  Received in formalin designated right breast 12:00 are a few fragments of yellowish fatty tissue and reddish blood clot measuring 2 cm in greatest aggregate dimension.  Submitted in 1 cassette.  Total time in formalin: 6 hours.  ROSALIA       Assessment:  This is a 68 y.o. female with a small 3 x 5 mm right breast lesion with pathology showing benign breast tissue with stromal fibrosis and adenosis as well as some focal atypical lobular hyperplasia (ALH).    Plan:  - Plan for continued high risk screening with Dr. Saab with alternating MRI and mammogram with ultrasound.  - No surgical excision of the site at this time.  Given the size of the lesion on ultrasound it was likely removed in its entirety with the core needle biopsy.  - I encouraged her to follow-up as needed or with any new questions or concerns    In reviewing her pathology and imaging reports, we discussed that traditionally if ALH was found on a core biopsy a further excisional biopsy was recommended due to concern for the possibility of upgrade to DCIS or invasive carcinoma in the vicinity of the lesion.  More recent studies have shown that with an adequate biopsy with a large core needle  and clear radiologic pathologic concordance and no other concerning features, the risk of upgrade is exceedingly low.  Therefore in these cases routine excision is not necessarily and surveillance is a reasonable option.  We discussed that excision could be an option for peace of mind or a palpable mass although would confer the risks of surgery which for Ms. Verduzco would include holding her anticoagulation and all of the cardiac risks associated with anesthesia.    We discuss that while ALH is not thought to be a premalignant lesion, it is a high risk lesion which increases her lifetime risk of breast cancer in either breast.  Using the Tyrer-Cuzick risk calculator, I estimate her personal 10-year risk of breast cancer at 15% and lifetime risk of 27.9% (greater than the population risk of 3.5 and 7% respectively).    Options for managing individuals at increased risk of breast cancer include high risk screening with an annual mammogram and annual breast MRI alternating every 6 months and chemoprevention with tamoxifen or aromatase inhibitor both of which she is discussed with Dr. Saab.  She is not interested in chemoprevention at this time, but is interested in high risk screening.  At this time any prophylactic risk reducing surgery would be a relatively radical strategy typically is reserved for patients with a genetic mutation conferring a higher lifetime risk.  Finally we discussed the importance of continuing to avoid tobacco use, eating a healthy plant-based diet and minimizing red meats, and continued exercise to avoid excess body fat or weight gain after menopause.      BMI is >= 30 and <35. (Class 1 Obesity). The following options were offered after discussion;: exercise counseling/recommendations and nutrition counseling/recommendations           Signed:    Chidi Arizmendi MD  Breast Surgical Oncology  [facility]

## 2024-11-19 ENCOUNTER — TELEPHONE (OUTPATIENT)
Dept: FAMILY MEDICINE CLINIC | Facility: CLINIC | Age: 69
End: 2024-11-19

## 2024-11-19 DIAGNOSIS — L98.9 SKIN LESION: Primary | ICD-10-CM

## 2024-11-19 DIAGNOSIS — N39.41 URGE INCONTINENCE OF URINE: ICD-10-CM

## 2024-11-19 DIAGNOSIS — Z85.820 HISTORY OF MELANOMA: ICD-10-CM

## 2024-11-19 RX ORDER — VIBEGRON 75 MG/1
1 TABLET, FILM COATED ORAL DAILY
Qty: 30 TABLET | Refills: 2 | Status: SHIPPED | OUTPATIENT
Start: 2024-11-19

## 2024-11-19 NOTE — TELEPHONE ENCOUNTER
"  Caller: Ibeth Vasquez \"Ibeth Farris\"    Relationship: Self    Best call back number: [phone]    Requested Prescriptions:   Requested Prescriptions     Pending Prescriptions Disp Refills    Vibegron (Gemtesa) 75 MG tablet 30 tablet 2     Sig: Take 1 tablet by mouth Daily.        Pharmacy where request should be sent: [facility] [id number] [place] [id number] [facility] - [phone]  - [phone]      Last office visit with prescribing clinician: 9/11/2024   Last telemedicine visit with prescribing clinician: Visit date not found   Next office visit with prescribing clinician: Visit date not found     Additional details provided by patient: STATED THAT THEY ARE OUT OF THE MEDICATION AND NEED TO GET IT FILLED AS SOON AS THEY CAN     Does the patient have less than a 3 day supply:  [x] Yes  [] No    Would you like a call back once the refill request has been completed: [] Yes [x] No    If the office needs to give you a call back, can they leave a voicemail: [] Yes [x] No    Izabela Collado Rep   11/19/24 15:43 EST       "

## 2024-11-19 NOTE — TELEPHONE ENCOUNTER
"  Caller: Ibeth Vasquez \"Ibeth Farris\"    Relationship: Self    Best call back number: [phone]    What is the medical concern/diagnosis: MELANOMA HISTORY    What specialty or service is being requested: DERMATOLOGY    What is the provider, practice or medical service name: DR. ALMEIDA    What is the office location: [address] [place] [address]    What is the office phone number: [phone]    Any additional details: STATED THAT THEY HAVE BEEN SEEING THE DERMATOLOGIST FOR YEARS ABOUT SPOTS OF POTENTIAL MELANOMA. STATED THAT THIS IS THE FIRST TIME THEY HAVE NEEDED A REFERRAL AND THEY HAVE A COUPLE OF SPOTS ON THEIR FACE CURRENTLY THAT THEY NEED TO HAVE CHECKED. STATED THAT THEY NEED TO HAVE THIS IN BEFORE 11/25 SO THEY CAN HAVE THEIR APPOINTMENT. PLEASE CALL AND ADVISE     FAX: [phone]  "

## 2024-12-12 DIAGNOSIS — E11.9 TYPE 2 DIABETES MELLITUS WITHOUT COMPLICATION, WITHOUT LONG-TERM CURRENT USE OF INSULIN: ICD-10-CM

## 2024-12-12 RX ORDER — TIRZEPATIDE 7.5 MG/.5ML
INJECTION, SOLUTION SUBCUTANEOUS
Qty: 2 ML | Refills: 0 | Status: SHIPPED | OUTPATIENT
Start: 2024-12-12

## 2024-12-18 DIAGNOSIS — E78.2 MIXED HYPERLIPIDEMIA: ICD-10-CM

## 2024-12-18 RX ORDER — ATORVASTATIN CALCIUM 20 MG/1
20 TABLET, FILM COATED ORAL DAILY
Qty: 90 TABLET | Refills: 0 | Status: SHIPPED | OUTPATIENT
Start: 2024-12-18

## 2025-01-08 DIAGNOSIS — E11.9 TYPE 2 DIABETES MELLITUS WITHOUT COMPLICATION, WITHOUT LONG-TERM CURRENT USE OF INSULIN: ICD-10-CM

## 2025-01-09 RX ORDER — TIRZEPATIDE 7.5 MG/.5ML
INJECTION, SOLUTION SUBCUTANEOUS
Qty: 2 ML | Refills: 0 | Status: SHIPPED | OUTPATIENT
Start: 2025-01-09

## 2025-01-21 PROBLEM — R05.2 SUBACUTE COUGH: Status: RESOLVED | Noted: 2023-02-27 | Resolved: 2025-01-21

## 2025-01-21 PROBLEM — S09.90XA HEAD TRAUMA: Status: RESOLVED | Noted: 2022-10-12 | Resolved: 2025-01-21

## 2025-01-21 PROBLEM — Z00.00 MEDICARE ANNUAL WELLNESS VISIT, INITIAL: Status: RESOLVED | Noted: 2024-06-12 | Resolved: 2025-01-21

## 2025-01-21 PROBLEM — R07.81 RIB PAIN ON RIGHT SIDE: Status: RESOLVED | Noted: 2022-12-22 | Resolved: 2025-01-21

## 2025-01-21 PROBLEM — R53.83 OTHER FATIGUE: Status: RESOLVED | Noted: 2024-07-17 | Resolved: 2025-01-21

## 2025-01-21 PROBLEM — S82.252A DISPLACED COMMINUTED FRACTURE OF SHAFT OF LEFT TIBIA, INITIAL ENCOUNTER FOR CLOSED FRACTURE: Status: RESOLVED | Noted: 2023-12-05 | Resolved: 2025-01-21

## 2025-01-21 PROBLEM — Z00.00 WELCOME TO MEDICARE PREVENTIVE VISIT: Status: RESOLVED | Noted: 2020-06-30 | Resolved: 2025-01-21

## 2025-01-21 PROBLEM — Z00.00 MEDICARE ANNUAL WELLNESS VISIT, SUBSEQUENT: Status: ACTIVE | Noted: 2025-01-21

## 2025-01-21 PROBLEM — Z23 NEED FOR VACCINATION: Status: RESOLVED | Noted: 2023-07-17 | Resolved: 2025-01-21

## 2025-01-21 PROBLEM — S82.61XA DISPLACED FRACTURE OF LATERAL MALLEOLUS OF RIGHT FIBULA, INITIAL ENCOUNTER FOR CLOSED FRACTURE: Status: RESOLVED | Noted: 2020-03-25 | Resolved: 2025-01-21

## 2025-01-24 ENCOUNTER — LAB (OUTPATIENT)
Dept: FAMILY MEDICINE CLINIC | Facility: CLINIC | Age: 70
End: 2025-01-24
Payer: MEDICARE

## 2025-01-24 ENCOUNTER — OFFICE VISIT (OUTPATIENT)
Dept: FAMILY MEDICINE CLINIC | Facility: CLINIC | Age: 70
End: 2025-01-24
Payer: MEDICARE

## 2025-01-24 VITALS
WEIGHT: 191.3 LBS | HEART RATE: 51 BPM | RESPIRATION RATE: 16 BRPM | HEIGHT: 64 IN | BODY MASS INDEX: 32.66 KG/M2 | DIASTOLIC BLOOD PRESSURE: 79 MMHG | SYSTOLIC BLOOD PRESSURE: 150 MMHG | TEMPERATURE: 97.5 F | OXYGEN SATURATION: 98 %

## 2025-01-24 DIAGNOSIS — K21.9 GASTROESOPHAGEAL REFLUX DISEASE WITHOUT ESOPHAGITIS: ICD-10-CM

## 2025-01-24 DIAGNOSIS — E78.2 MIXED HYPERLIPIDEMIA: ICD-10-CM

## 2025-01-24 DIAGNOSIS — E11.9 TYPE 2 DIABETES MELLITUS WITHOUT COMPLICATION, WITHOUT LONG-TERM CURRENT USE OF INSULIN: ICD-10-CM

## 2025-01-24 DIAGNOSIS — Z00.00 MEDICARE ANNUAL WELLNESS VISIT, SUBSEQUENT: Primary | ICD-10-CM

## 2025-01-24 DIAGNOSIS — Z12.11 COLON CANCER SCREENING: ICD-10-CM

## 2025-01-24 DIAGNOSIS — Z23 NEED FOR INFLUENZA VACCINATION: ICD-10-CM

## 2025-01-24 PROCEDURE — 36415 COLL VENOUS BLD VENIPUNCTURE: CPT | Performed by: FAMILY MEDICINE

## 2025-01-24 PROCEDURE — 82043 UR ALBUMIN QUANTITATIVE: CPT | Performed by: FAMILY MEDICINE

## 2025-01-24 PROCEDURE — 80053 COMPREHEN METABOLIC PANEL: CPT | Performed by: FAMILY MEDICINE

## 2025-01-24 PROCEDURE — 80061 LIPID PANEL: CPT | Performed by: FAMILY MEDICINE

## 2025-01-24 PROCEDURE — 82570 ASSAY OF URINE CREATININE: CPT | Performed by: FAMILY MEDICINE

## 2025-01-24 PROCEDURE — 83036 HEMOGLOBIN GLYCOSYLATED A1C: CPT | Performed by: FAMILY MEDICINE

## 2025-01-24 RX ORDER — PANTOPRAZOLE SODIUM 20 MG/1
20 TABLET, DELAYED RELEASE ORAL EVERY 24 HOURS
Qty: 90 TABLET | Refills: 1 | Status: SHIPPED | OUTPATIENT
Start: 2025-01-24

## 2025-01-24 RX ORDER — ATORVASTATIN CALCIUM 20 MG/1
20 TABLET, FILM COATED ORAL DAILY
Qty: 90 TABLET | Refills: 0 | Status: SHIPPED | OUTPATIENT
Start: 2025-01-24

## 2025-01-24 NOTE — ASSESSMENT & PLAN NOTE
Orders:    Comprehensive Metabolic Panel    Hemoglobin A1c    Microalbumin / Creatinine Urine Ratio - Urine, Clean Catch    Lipid Panel

## 2025-01-24 NOTE — PROGRESS NOTES
Subjective   The ABCs of the Annual Wellness Visit  Medicare Wellness Visit    Chief Complaint   Patient presents with    Medicare Wellness-subsequent       Ibeth Kiser is a 69 y.o. patient who presents for a Medicare Wellness Visit.  She lives by herself, she is fully independent with her activities of daily living and no issues with depression or memory problems are being reported.  Patient is followed by cardiology and GI on regular basis.  She had her screening mammogram done in the summer of last year which showed suspicious spot and subsequently she underwent breast biopsy, which showed focal atypical lobular hyperplasia.  Patient was subsequently evaluated by breast surgeon in [facility] and saw the oncologist in [place] couple of months later.  She again saw another breast surgeon last year.  No surgical intervention was not recommended as the spot was very small and believed to be removed completely with a core biopsy.  Patient was found to be not the ideal candidate for chemoprophylaxis with aromatase inhibitor due to her history of atrial fibrillation and increased risk of stroke.  Patient is scheduled to follow-up with her oncologist next week.  Recommendation was made to continue with annual mammograms and annual breast MRIs to alternate every 6 months.  Patient reports that over a month ago she ran out of her acid reducer and her acid reflux symptoms have been getting much worse and she would like to get refill.    The following portions of the patient's history were reviewed and   updated as appropriate: allergies, current medications, past family history, past medical history, past social history, past surgical history, and problem list.    Compared to one year ago, the patient's physical   health is the same.  Compared to one year ago, the patient's mental   health is the same.    Recent Hospitalizations:  She was not admitted to the hospital during the last year.     Current Medical  Providers:  Patient Care Team:  Chelo Saavedra MD as PCP - General  [facility] Phys[facility] (Cardiology[facility]  Cornelio Ibrahim MD as Consulting Physician (Gastroenterology)  Damien Nguyen APRN as Nurse Practitioner (Breast Surgery)  Paula Saab MD as Consulting Physician (Hematology and Oncology)  Chidi Arizmendi MD as Consulting Physician (Breast Surgery)  Chidi Arizmendi MD as Consulting Physician (Breast Surgery)    Outpatient Medications Prior to Visit   Medication Sig Dispense Refill    Accu-Chek FastClix Lancets misc USE AS DIRECTED TO TEST BLOOD SUGAR ONCE DAILY 102 each 0    apixaban (ELIQUIS) 5 MG tablet tablet Take 1 tablet by mouth Every 12 (Twelve) Hours. 180 tablet 4    Blood Glucose Monitoring Suppl (ACCU-CHEK LAKEISHA) device Use as instructed to check blood sugar 1 each 0    dilTIAZem CD (CARDIZEM CD) 240 MG 24 hr capsule TAKE 1 CAPSULE BY MOUTH EVERY MORNING 90 capsule 3    escitalopram (LEXAPRO) 20 MG tablet TAKE 1 TABLET BY MOUTH DAILY 90 tablet 1    furosemide (LASIX) 20 MG tablet Take 1 tablet by mouth Daily. 90 tablet 3    glucose blood (Accu-Chek Lakeisha) test strip Use as instructed to check blood sugar once a day 100 each 1    metoprolol succinate XL (TOPROL-XL) 200 MG 24 hr tablet TAKE 1 TABLET BY MOUTH EVERY  tablet 3    Mounjaro 7.5 MG/0.5ML solution auto-injector Inject 0.5 mL under the skin into the appropriate area as directed 1 (One) Time Per Week. 2 mL 0    OCALIVA 5 MG tablet Take 1 tablet by mouth Daily. 2 pm (test drug) to reduce LE's      ondansetron ODT (ZOFRAN-ODT) 8 MG disintegrating tablet Place 1 tablet on the tongue Every 8 (Eight) Hours As Needed for Nausea or Vomiting. 10 tablet 0    Vibegron (Gemtesa) 75 MG tablet Take 1 tablet by mouth Daily. 30 tablet 2    atorvastatin (LIPITOR) 20 MG tablet Take 1 tablet by mouth Daily 90 tablet 0    pantoprazole (Protonix) 20 MG EC tablet Take 1 tablet by mouth Daily. Take day of surgery  90 tablet 1    meloxicam (MOBIC) 7.5 MG tablet Take 1 tablet by mouth Daily As Needed for Moderate Pain. (Patient not taking: Reported on 1/24/2025) 30 tablet 4    methocarbamol (ROBAXIN) 500 MG tablet Take 1 tablet by mouth 3 (Three) Times a Day As Needed for Muscle Spasms. (Patient not taking: Reported on 1/24/2025) 90 tablet 0    ursodiol (ACTIGALL) 500 MG tablet Take 1 tablet by mouth 3 (Three) Times a Day. (Patient not taking: Reported on 1/24/2025)       No facility-administered medications prior to visit.     No opioid medication identified on active medication list. I have reviewed chart for other potential  high risk medication/s and harmful drug interactions in the elderly.      Aspirin is not on active medication list.  Aspirin use is not indicated based on review of current medical condition/s. Risk of harm outweighs potential benefits.  .    Patient Active Problem List   Diagnosis    Anxiety    Atrial fibrillation    Depression    Gastroesophageal reflux disease    Hypertension    Primary biliary cirrhosis    Visit for screening mammogram    Vitamin D deficiency    Trochanteric bursitis of left hip    Chronic bilateral low back pain without sciatica    Heart murmur    Compression fracture of T12 vertebra    DDD (degenerative disc disease), thoracic    Acute right flank pain    Multiple closed fractures of ribs of right side    Postmenopausal    Abnormal EKG    Need for influenza vaccination    Osteopenia of lumbar spine    Compression fracture of L2 lumbar vertebra    Abnormal findings on diagnostic imaging of other parts of digestive tract    Diarrhea    Elevation of level of transaminase and lactic acid dehydrogenase (LDH)    Epigastric pain    Gastroduodenitis    Hyperlipidemia    Lesion of liver    Other diseases of stomach and duodenum    Pruritic rash    Sleep apnea    Ulcer of intestine    Hypertension    Melanoma    Type 2 diabetes mellitus without complication, without long-term current use of  "insulin    Fibula fracture    Colon cancer screening    Urge incontinence of urine    Vitamin B12 deficiency    Atypical lobular hyperplasia (ALH) of right breast    Medicare annual wellness visit, subsequent     Advance Care Planning Advance Directive is not on file.  ACP discussion was held with the patient during this visit. Patient has an advance directive (not in EMR), copy requested.      Review of Systems   Constitutional:  Negative for activity change, fatigue and fever.   Respiratory:  Negative for cough, shortness of breath and wheezing.    Cardiovascular:  Negative for chest pain, palpitations and leg swelling.   Gastrointestinal:  Positive for GERD and indigestion. Negative for constipation and diarrhea.   Skin:  Negative for color change, dry skin and rash.   Neurological:  Negative for tremors and headache.           Objective   Vitals:    01/24/25 1139   BP: 150/79   BP Location: Left arm   Patient Position: Sitting   Cuff Size: Adult   Pulse: 51   Resp: 16   Temp: 97.5 °F (36.4 °C)   TempSrc: Infrared   SpO2: 98%   Weight: 86.8 kg (191 lb 4.8 oz)   Height: 162.6 cm (64.02\")   PainSc: 0-No pain       Estimated body mass index is 32.82 kg/m² as calculated from the following:    Height as of this encounter: 162.6 cm (64.02\").    Weight as of this encounter: 86.8 kg (191 lb 4.8 oz).        Physical Exam  Vitals and nursing note reviewed.   Constitutional:       General: She is not in acute distress.     Appearance: Normal appearance. She is well-developed. She is not ill-appearing.   HENT:      Head: Normocephalic and atraumatic.   Cardiovascular:      Rate and Rhythm: Normal rate and regular rhythm.      Heart sounds: Normal heart sounds. No murmur heard.     No gallop.   Pulmonary:      Effort: Pulmonary effort is normal. No respiratory distress.      Breath sounds: Normal breath sounds. No wheezing, rhonchi or rales.   Chest:      Chest wall: No tenderness.   Musculoskeletal:      Cervical back: " Normal range of motion and neck supple.   Neurological:      General: No focal deficit present.      Mental Status: She is alert and oriented to person, place, and time. Mental status is at baseline.   Psychiatric:         Mood and Affect: Mood normal.         Does the patient have evidence of cognitive impairment? No  Lab Results   Component Value Date    TRIG 130 2025    HDL 81 (H) 2025    LDL 98 2025    VLDL 22 2025    HGBA1C 5.20 2025                                                                                               Health  Risk Assessment    Smoking Status:  Social History     Tobacco Use   Smoking Status Never    Passive exposure: Never   Smokeless Tobacco Never     Alcohol Consumption:  Social History     Substance and Sexual Activity   Alcohol Use Not Currently    Alcohol/week: 2.0 standard drinks of alcohol    Types: 2 Glasses of wine per week    Comment: s0cial       Fall Risk Screen  STEADI Fall Risk Assessment was completed, and patient is at LOW risk for falls.Assessment completed on:2025    Depression Screening   Little interest or pleasure in doing things? Not at all   Feeling down, depressed, or hopeless? Not at all   PHQ-2 Total Score 0      Health Habits and Functional and Cognitive Screenin/24/2025    11:00 AM   Functional & Cognitive Status   Do you have difficulty preparing food and eating? No   Do you have difficulty bathing yourself, getting dressed or grooming yourself? No   Do you have difficulty using the toilet? No   Do you have difficulty moving around from place to place? No   Do you have trouble with steps or getting out of a bed or a chair? No   Current Diet Diabetic Diet   Dental Exam Up to date   Eye Exam Up to date   Exercise (times per week) 0 times per week   Current Exercises Include No Regular Exercise   Do you need help using the phone?  No   Are you deaf or do you have serious difficulty hearing?  No   Do you need help to  go to places out of walking distance? No   Do you need help shopping? No   Do you need help preparing meals?  No   Do you need help with housework?  No   Do you need help with laundry? No   Do you need help taking your medications? No   Do you need help managing money? No   Do you ever drive or ride in a car without wearing a seat belt? No   Have you felt unusual stress, anger or loneliness in the last month? No   Who do you live with? Alone   If you need help, do you have trouble finding someone available to you? No   Do you have difficulty concentrating, remembering or making decisions? No           Age-appropriate Screening Schedule:  Refer to the list below for future screening recommendations based on patient's age, sex and/or medical conditions. Orders for these recommended tests are listed in the plan section. The patient has been provided with a written plan.    Health Maintenance List  Health Maintenance   Topic Date Due    DIABETIC FOOT EXAM  Never done    ZOSTER VACCINE (1 of 2) Never done    PAP SMEAR  Never done    COLORECTAL CANCER SCREENING  11/29/2023    DIABETIC EYE EXAM  02/01/2025    Hepatitis B (2 of 3 - Risk 3-dose series) 03/11/2025 (Originally 8/6/2018)    TDAP/TD VACCINES (1 - Tdap) 03/11/2025 (Originally 10/20/1974)    COVID-19 Vaccine (4 - 2024-25 season) 09/03/2025 (Originally 9/1/2024)    ANNUAL WELLNESS VISIT  06/12/2025    HEMOGLOBIN A1C  07/24/2025    BMI FOLLOWUP  10/15/2025    LIPID PANEL  01/24/2026    URINE MICROALBUMIN  01/24/2026    MAMMOGRAM  09/12/2026    DXA SCAN  10/09/2026    HEPATITIS C SCREENING  Completed    INFLUENZA VACCINE  Completed    Pneumococcal Vaccine 65+  Completed                                                                                                                                                CMS Preventative Services Quick Reference  Risk Factors Identified During Encounter  Fall Risk-High or Moderate: Discussed Fall Prevention in the  home  Immunizations Discussed/Encouraged: COVID19 and RSV (Respiratory Syncytial Virus)  Dental Screening Recommended  Vision Screening Recommended    The above risks/problems have been discussed with the patient.  Pertinent information has been shared with the patient in the After Visit Summary.  An After Visit Summary and PPPS were made available to the patient.    Follow Up:   Next Medicare Wellness visit to be scheduled in 1 year.     Assessment & Plan  Medicare annual wellness visit, subsequent         Colon cancer screening    Orders:    Cologuard - Stool, Per Rectum; Future    Need for influenza vaccination    Orders:    Fluzone High-Dose 65+yrs (3779-5103)    Type 2 diabetes mellitus without complication, without long-term current use of insulin      Orders:    Comprehensive Metabolic Panel    Hemoglobin A1c    Microalbumin / Creatinine Urine Ratio - Urine, Clean Catch    Lipid Panel    Gastroesophageal reflux disease without esophagitis    Orders:    pantoprazole (Protonix) 20 MG EC tablet; Take 1 tablet by mouth Daily. Take day of surgery    Mixed hyperlipidemia       Orders:    atorvastatin (LIPITOR) 20 MG tablet; Take 1 tablet by mouth Daily.       Medicare wellness exam was done today.  I will be getting fasting blood work.  I also reviewed her health maintenance.  Her last colon cancer screening was done through negative Cologuard in 11/2020 and the new order was given to the patient to complete Cologuard.  Her last mammogram was in 7/2024, patient subsequently had breast biopsy which was consistent with atypical lobular hyperplasia and she saw 2 surgeons in a oncologist for the consultation.  Patient will have regular monitoring including mammograms and possibly MRI of the breast.  She is scheduled to follow-up with her oncologist in near future.  Her last DEXA scan was in 10/2024 and showed some bone loss in osteopenia range.  Her acid reflux symptoms are poorly controlled, patient ran out of her PPI  about a month ago.  A new prescription was provided for the patient and she will continue to monitor the symptoms, if no improvement she will address it with her GI specialist at her upcoming follow-up appointment.  Immunization was also reviewed and recommended and she was given a flu shot today.  Healthy lifestyle was reinforced.      Follow Up:     Return in about 3 months (around 4/24/2025) for Next scheduled follow up.    Requested Prescriptions     Signed Prescriptions Disp Refills    pantoprazole (Protonix) 20 MG EC tablet 90 tablet 1     Sig: Take 1 tablet by mouth Daily. Take day of surgery    atorvastatin (LIPITOR) 20 MG tablet 90 tablet 0     Sig: Take 1 tablet by mouth Daily.         Chelo Saavedra MD  01/24/2025  11:45 EST

## 2025-01-24 NOTE — PROGRESS NOTES
Injection  Injection performed in LEFT DELTOID by Bia Carnes MA. Patient tolerated the procedure well without complications.  01/24/25   Bia Carnes MA

## 2025-01-24 NOTE — ASSESSMENT & PLAN NOTE
Orders:    pantoprazole (Protonix) 20 MG EC tablet; Take 1 tablet by mouth Daily. Take day of surgery

## 2025-01-25 LAB
ALBUMIN SERPL-MCNC: 4.1 G/DL (ref 3.5–5.2)
ALBUMIN UR-MCNC: 5.5 MG/DL
ALBUMIN/GLOB SERPL: 1.4 G/DL
ALP SERPL-CCNC: 563 U/L (ref 39–117)
ALT SERPL W P-5'-P-CCNC: 29 U/L (ref 1–33)
ANION GAP SERPL CALCULATED.3IONS-SCNC: 15.2 MMOL/L (ref 5–15)
AST SERPL-CCNC: 40 U/L (ref 1–32)
BILIRUB SERPL-MCNC: 0.7 MG/DL (ref 0–1.2)
BUN SERPL-MCNC: 12 MG/DL (ref 8–23)
BUN/CREAT SERPL: 16 (ref 7–25)
CALCIUM SPEC-SCNC: 10 MG/DL (ref 8.6–10.5)
CHLORIDE SERPL-SCNC: 101 MMOL/L (ref 98–107)
CHOLEST SERPL-MCNC: 201 MG/DL (ref 0–200)
CO2 SERPL-SCNC: 26.8 MMOL/L (ref 22–29)
CREAT SERPL-MCNC: 0.75 MG/DL (ref 0.57–1)
CREAT UR-MCNC: 261.4 MG/DL
EGFRCR SERPLBLD CKD-EPI 2021: 87.4 ML/MIN/1.73
GLOBULIN UR ELPH-MCNC: 3 GM/DL
GLUCOSE SERPL-MCNC: 93 MG/DL (ref 65–99)
HBA1C MFR BLD: 5.2 % (ref 4.8–5.6)
HDLC SERPL-MCNC: 81 MG/DL (ref 40–60)
LDLC SERPL CALC-MCNC: 98 MG/DL (ref 0–100)
LDLC/HDLC SERPL: 1.16 {RATIO}
MICROALBUMIN/CREAT UR: 21 MG/G (ref 0–29)
POTASSIUM SERPL-SCNC: 4.1 MMOL/L (ref 3.5–5.2)
PROT SERPL-MCNC: 7.1 G/DL (ref 6–8.5)
SODIUM SERPL-SCNC: 143 MMOL/L (ref 136–145)
TRIGL SERPL-MCNC: 130 MG/DL (ref 0–150)
VLDLC SERPL-MCNC: 22 MG/DL (ref 5–40)

## 2025-01-31 DIAGNOSIS — F41.9 ANXIETY: ICD-10-CM

## 2025-01-31 RX ORDER — ESCITALOPRAM OXALATE 20 MG/1
20 TABLET ORAL DAILY
Qty: 90 TABLET | Refills: 1 | Status: SHIPPED | OUTPATIENT
Start: 2025-01-31

## 2025-02-03 ENCOUNTER — OFFICE (AMBULATORY)
Dept: URBAN - METROPOLITAN AREA CLINIC 64 | Facility: CLINIC | Age: 70
End: 2025-02-03
Payer: MEDICARE

## 2025-02-03 VITALS
SYSTOLIC BLOOD PRESSURE: 139 MMHG | WEIGHT: 195 LBS | HEIGHT: 67 IN | HEART RATE: 49 BPM | DIASTOLIC BLOOD PRESSURE: 79 MMHG

## 2025-02-03 DIAGNOSIS — K74.3 PRIMARY BILIARY CIRRHOSIS: ICD-10-CM

## 2025-02-03 DIAGNOSIS — R74.8 ABNORMAL LEVELS OF OTHER SERUM ENZYMES: ICD-10-CM

## 2025-02-03 DIAGNOSIS — K75.4 AUTOIMMUNE HEPATITIS: ICD-10-CM

## 2025-02-03 PROCEDURE — 99213 OFFICE O/P EST LOW 20 MIN: CPT | Performed by: NURSE PRACTITIONER

## 2025-02-12 ENCOUNTER — TELEPHONE (OUTPATIENT)
Dept: FAMILY MEDICINE CLINIC | Facility: CLINIC | Age: 70
End: 2025-02-12
Payer: MEDICARE

## 2025-02-12 NOTE — TELEPHONE ENCOUNTER
"  Caller: Ibeth Vasquez \"Ibeth Farris\"    Relationship: Self    Best call back number: [phone]     What medication are you requesting: ALTERNATIVE FOR   Mounjaro 7.5 MG/0.5ML solution auto-injector       What are your current symptoms: DIABETIC    How long have you been experiencing symptoms:     Have you had these symptoms before:    [x] Yes  [] No    Have you been treated for these symptoms before:   [] Yes  [] No    If a prescription is needed, what is your preferred pharmacy and phone number: [id number] - [place], [place] - [address] - [phone] [facility] [phone]      Additional notes: PATIENT STATES THAT HER INSURANCE WILL NOT COVER THIS.        "

## 2025-02-12 NOTE — TELEPHONE ENCOUNTER
I really would like the patient to stay on Mounjaro as she has done very well with that, she has been tolerating it well as far as I know and her diabetes looks very good.  Patient was previously on Ozempic, but we had to discontinue due to GI side effects.  Did we get the PA?  Can we possibly work on the prior authorization?

## 2025-02-13 ENCOUNTER — PRIOR AUTHORIZATION (OUTPATIENT)
Dept: FAMILY MEDICINE CLINIC | Facility: CLINIC | Age: 70
End: 2025-02-13
Payer: MEDICARE

## 2025-02-13 NOTE — TELEPHONE ENCOUNTER
PA APPROVED.    PA Case: [id number], Status: Approved, Coverage Starts on: 1/1/2025 12:00:00 AM, Coverage Ends on: 12/31/2025 12:00:00 AM. Questions? Contact [phone].

## 2025-02-13 NOTE — TELEPHONE ENCOUNTER
HUB TO RELAY    Called patient and lvm. I advised that the PA for Mounjaro was approved. She can call her pharmacy to get it refilled.

## 2025-02-20 DIAGNOSIS — E11.9 TYPE 2 DIABETES MELLITUS WITHOUT COMPLICATION, WITHOUT LONG-TERM CURRENT USE OF INSULIN: ICD-10-CM

## 2025-02-20 RX ORDER — TIRZEPATIDE 7.5 MG/.5ML
INJECTION, SOLUTION SUBCUTANEOUS
Qty: 2 ML | Refills: 0 | Status: SHIPPED | OUTPATIENT
Start: 2025-02-20

## 2025-02-27 ENCOUNTER — TELEPHONE (OUTPATIENT)
Dept: FAMILY MEDICINE CLINIC | Facility: CLINIC | Age: 70
End: 2025-02-27

## 2025-02-27 NOTE — TELEPHONE ENCOUNTER
"  Hub staff attempted to follow warm transfer process and was unsuccessful     Caller: Ibeth Farris \"Ibeth Farris\"    Relationship to patient: Self    Best call back number: [phone]     Patient is needing: PATIENT IS RETURNING A MISSED CALL FROM THE OFFICE. PLEASE CALL PATIENT BACK.        " Graft Cartilage Fenestration Text: The cartilage was fenestrated with a 2mm punch biopsy to help facilitate graft survival and healing.

## 2025-03-24 DIAGNOSIS — E11.9 TYPE 2 DIABETES MELLITUS WITHOUT COMPLICATION, WITHOUT LONG-TERM CURRENT USE OF INSULIN: ICD-10-CM

## 2025-03-24 RX ORDER — TIRZEPATIDE 7.5 MG/.5ML
INJECTION, SOLUTION SUBCUTANEOUS
Qty: 2 ML | Refills: 0 | Status: SHIPPED | OUTPATIENT
Start: 2025-03-24

## 2025-04-18 DIAGNOSIS — E78.2 MIXED HYPERLIPIDEMIA: ICD-10-CM

## 2025-04-18 RX ORDER — ATORVASTATIN CALCIUM 20 MG/1
20 TABLET, FILM COATED ORAL DAILY
Qty: 90 TABLET | Refills: 0 | Status: SHIPPED | OUTPATIENT
Start: 2025-04-18

## 2025-04-23 DIAGNOSIS — E11.9 TYPE 2 DIABETES MELLITUS WITHOUT COMPLICATION, WITHOUT LONG-TERM CURRENT USE OF INSULIN: ICD-10-CM

## 2025-04-23 RX ORDER — TIRZEPATIDE 7.5 MG/.5ML
INJECTION, SOLUTION SUBCUTANEOUS
Qty: 2 ML | Refills: 0 | Status: SHIPPED | OUTPATIENT
Start: 2025-04-23

## 2025-04-28 NOTE — PROGRESS NOTES
Encounter Date:05/07/2025  Last seen 8/28/2024.      Patient ID: Ibeth Farris is a 69 y.o. female.      Chief Complaint:     History of atrial fibrillation flutter  Status post ablation  Hypertension  History of premature ventricle contractions        History of Present Illness  Patient recently had cataract surgery.    Since I have last seen, the patient has been without any chest discomfort ,shortness of breath, palpitations, dizziness or syncope.  Denies having any headache ,abdominal pain ,nausea, vomiting , diarrhea constipation, loss of weight or loss of appetite.  Denies having any excessive bruising ,hematuria or blood in the stool.    Review of all systems negative except as indicated.    Reviewed ROS.  Assessment and Plan         ]]]]]]]]]]]]]]]  History  =====  -history of atrial flutter fibrillation with a rapid ventricular response.  Patient is maintaining sinus rhythm.     -status post right atrial flutter ablation 09/14/2018.  Patient is maintaining sinus rhythm.     -Patient had acute pulmonary edema requiring intubation and extubation.  Cardiac catheterization 09/13/2018 revealed Normal coronary arteries except for 30% proximal LAD disease.     -history of premature ventricle contractions with nonsustained ventricular tachycardia during left ventricular angiogram.  EP study was negative for ventricular dysrhythmia.     Aggravating factors for atrial dysrhythmia likely due to thyroid hormone which she is receiving has supplements.  Patient is not hypothyroid.  Patient is receiving as a part of hormone therapy from 25 again. patient is off of more thyroid normal.     -Mild aortic valve stenosis.  Echocardiogram 8/23/2023 revealed  Mild aortic valve stenosis with gradient of 15/7 mmHg and valve area of 1.8 cm².  Left atrial enlargement.  Left ventricular size and contractility is normal with ejection fraction of 60%.     Echocardiogram 11/7/2022 revealed mild aortic valve stenosis with gradient of  24/12 mmHg and valve area of 1.7 cm².  Left atrial enlargement.  Stress Cardiolite test-8/23/2033-normal.       -hypokalemia  and hypermagnesemia -improved     -history of hypertension. -improved     -Diabetes     -History of significant hypotension due to tachycardia improved with conversion to sinus rhythm and IV fluids.       -Mild elevation of troponin.  0.09 0.1 and 0.13-probable non STEMI.  However this could be due to demand ischemia.      -Chronic depression      -Primary Biliary Cholangitis     -GERD/Gastric ulcers     -status post right elbow melanoma removal.      -Allergic to lisinopril and codeine  ==  Plan   =  History of atrial flutter and fibrillation.  Status post ablation.  Patient has converted and maintaining sinus rhythm.    EKG showed sinus rhythm without ischemic changes.  (Last visit)  EKG was not performed today.  EKG 7/17/2023-primary care office reviewed showing nonspecific ST-T wave abnormalities  EKG (stress test) 8/23/2023-sinus rhythm.  EKG 8/28/2024-sinus rhythm nonspecific ST-T wave abnormalities.  EKG 5/7/2025-sinus bradycardia without ischemic changes    Anticoagulation status reviewed.  Elevated [id number]-MRPf score  Continue Eliquis 5 mg twice daily.  Observe for toxic effects.    Hypertension  147/79.  Continue metoprolol and Cardizem CD.  Patient is allergic to lisinopril.  Have discussed with patient regarding weight reduction etc.  Low-salt diet.  Other option would be to start hydralazine.     Systolic murmur.  Mild aortic valve stenosis.  (Echocardiogram 8/23/2023)-as above     History of pulmonary edema-resolved.  No further episodes.     Patient is not having any palpitations dizziness or syncope.     continue metoprolol  mg a day and continue  baby aspirin Cardizem  mg  once a day magnesium and potassium supplements Lasix 20 mg a day  Patient was asked to take extra 1/4 to 1/2 tablet of metoprolol as needed.  Continue increased dose of Altace 10 mg a  day.  Start Eliquis 5 mg twice daily.     Medications were reviewed and updated.    Follow-up in the office in 6 months.    Further plan will depend on patient's progress.    Reviewed and updated 5/7/2025  ]]][[[[           Diagnosis Plan   1. Paroxysmal atrial fibrillation        2. Hypokalemia        3. Essential hypertension        4. Hypomagnesemia        5. Acute pulmonary edema        6. Heart murmur        7. Tiredness        LAB RESULTS (LAST 7 DAYS)    CBC        BMP        CMP         BNP        TROPONIN        CoAg        Creatinine Clearance  CrCl cannot be calculated (Patient's most recent lab result is older than the maximum 30 days allowed.).    ABG        Radiology  No radiology results for the last day                The following portions of the patient's history were reviewed and updated as appropriate: allergies, current medications, past family history, past medical history, past social history, past surgical history, and problem list.    Review of Systems   Constitutional: Negative for malaise/fatigue.   Cardiovascular:  Negative for chest pain, leg swelling, palpitations and syncope.   Respiratory:  Negative for shortness of breath.    Skin:  Negative for rash.   Gastrointestinal:  Negative for nausea and vomiting.   Neurological:  Negative for dizziness, light-headedness and numbness.   All other systems reviewed and are negative.      Current Outpatient Medications:   •  Accu-Chek FastClix Lancets misc, USE AS DIRECTED TO TEST BLOOD SUGAR ONCE DAILY, Disp: 102 each, Rfl: 0  •  apixaban (ELIQUIS) 5 MG tablet tablet, Take 1 tablet by mouth Every 12 (Twelve) Hours., Disp: 180 tablet, Rfl: 4  •  atorvastatin (LIPITOR) 20 MG tablet, Take 1 tablet by mouth Daily, Disp: 90 tablet, Rfl: 0  •  Blood Glucose Monitoring Suppl (ACCU-CHEK KRYSTEN) device, Use as instructed to check blood sugar, Disp: 1 each, Rfl: 0  •  dilTIAZem CD (CARDIZEM CD) 240 MG 24 hr capsule, TAKE 1 CAPSULE BY MOUTH EVERY MORNING,  Disp: 90 capsule, Rfl: 3  •  escitalopram (LEXAPRO) 20 MG tablet, TAKE 1 TABLET BY MOUTH DAILY, Disp: 90 tablet, Rfl: 1  •  furosemide (LASIX) 20 MG tablet, Take 1 tablet by mouth Daily., Disp: 90 tablet, Rfl: 3  •  glucose blood (Accu-Chek Lakeisha) test strip, Use as instructed to check blood sugar once a day, Disp: 100 each, Rfl: 1  •  Livdelzi 10 MG capsule, Take 10 mg by mouth Daily., Disp: , Rfl:   •  metoprolol succinate XL (TOPROL-XL) 200 MG 24 hr tablet, TAKE 1 TABLET BY MOUTH EVERY DAY, Disp: 100 tablet, Rfl: 3  •  Mounjaro 7.5 MG/0.5ML solution auto-injector, Inject 0.5 mL under the skin into the appropriate area as directed 1 Time Per Week., Disp: 2 mL, Rfl: 0  •  ondansetron ODT (ZOFRAN-ODT) 8 MG disintegrating tablet, Place 1 tablet on the tongue Every 8 (Eight) Hours As Needed for Nausea or Vomiting., Disp: 10 tablet, Rfl: 0  •  pantoprazole (Protonix) 20 MG EC tablet, Take 1 tablet by mouth Daily. Take day of surgery, Disp: 90 tablet, Rfl: 1  •  Vibegron (Gemtesa) 75 MG tablet, Take 1 tablet by mouth Daily., Disp: 30 tablet, Rfl: 2    Allergies   Allergen Reactions   • Codeine Anxiety   • Lisinopril Hives   • Kiwi Unknown - High Severity   • Kiwi Extract Swelling       Family History   Problem Relation Age of Onset   • Multiple myeloma Mother    • Heart disease Mother        Past Surgical History:   Procedure Laterality Date   • ANKLE OPEN REDUCTION INTERNAL FIXATION Right 03/26/2020    Procedure: ANKLE OPEN REDUCTION INTERNAL FIXATION lateral malleolus with syndesmotic fixation;  Surgeon: Kaleb Contreras MD;  Location: [facility] MAIN OR;  Service: Orthopedics;  Laterality: Right;   • ANKLE OPEN REDUCTION INTERNAL FIXATION Left 04/20/2023    Procedure: ANKLE OPEN REDUCTION INTERNAL FIXATION;  Surgeon: Juvenal Mcnulty MD;  Location: [facility] MAIN OR;  Service: Orthopedics;  Laterality: Left;   • AV NODE ABLATION  09/14/2018   • BREAST BIOPSY Right 2024   • CATARACT EXTRACTION, BILATERAL Bilateral 04/2025   •  COLONOSCOPY     • CYSTOSCOPY, URETEROSCOPY, RETROGRADE PYELOGRAM, STENT INSERTION Right 11/01/2021    Procedure: CYSTOSCOPY, right  URETEROSCOPY, right RETROGRADE PYELOGRAM, balloon dilation of right ureteral stricture, right ureteral stent placement STENT INSERTION;  Surgeon: Chuck Lewis MD;  Location: [facility] MAIN OR;  Service: Urology;  Laterality: Right;   • ENDOSCOPY     • FRACTURE SURGERY     • HARDWARE REMOVAL Right 07/28/2020    Procedure: RIGHT ANKLE HARDWARE REMOVAL;  Surgeon: Kaleb Contreras MD;  Location: [facility] MAIN OR;  Service: Orthopedics;  Laterality: Right;   • LIVER BIOPSY     • SKIN BIOPSY     • SKIN CANCER EXCISION  2000    melanoma - right arm - Dr. Mcdaniel   • TIBIA IM NAILING/RODDING Left 12/06/2023    Procedure: TIBIA INTRAMEDULLARY NAIL/TOBIN INSERTION;  Surgeon: Luigi Alvarado MD;  Location: [facility] MAIN OR;  Service: Orthopedics;  Laterality: Left;   • UPPER ENDOSCOPIC ULTRASOUND W/ FNA N/A 02/15/2022    Procedure: EUS GUIDED LIVER BX;  Surgeon: Sarina Jarquin MD;  Location: [facility] ENDOSCOPY;  Service: Gastroenterology;  Laterality: N/A;  esophagitis, hiatal hernia       Past Medical History:   Diagnosis Date   • Allergic    • Ankle fracture 03/2020    right   • Anxiety    • Atrial fibrillation    • Cancer     skin can- right elbow    • Congenital heart failure     Patient states she does not have this   • Diabetes mellitus    • GERD (gastroesophageal reflux disease)    • Hot flashes    • Hypertension    • Injury of back    • Myocardial infarct 2018   • Primary biliary cirrhosis 2013   • Sleep apnea     CPAP- to bring dos       Family History   Problem Relation Age of Onset   • Multiple myeloma Mother    • Heart disease Mother        Social History     Socioeconomic History   • Marital status:    Tobacco Use   • Smoking status: Never     Passive exposure: Never   • Smokeless tobacco: Never   Vaping Use   • Vaping status: Never Used   Substance and Sexual Activity   •  "Alcohol use: Not Currently     Alcohol/week: 2.0 standard drinks of alcohol     Types: 2 Glasses of wine per week     Comment: s0cial   • Drug use: Never   • Sexual activity: Not Currently           ECG 12 Lead    Date/Time: 5/7/2025 3:45 PM  Performed by: Kwadwo Thibodeaux MD    Authorized by: Kwadwo Thibodeaux MD  Comparison: compared with previous ECG   Similar to previous ECG  Comparison to previous ECG: Sinus bradycardia 52/min nonspecific ST-T wave changes QTc 463 ms.  No ectopy no significant change from previous EKG.        Objective:       Physical Exam    /79 (BP Location: Right arm, Patient Position: Sitting, Cuff Size: Adult)   Pulse 52   Ht 162.6 cm (64\")   SpO2 100%   BMI 32.84 kg/m²   The patient is alert, oriented and in no distress.    Vital signs as noted above.    Head and neck revealed no carotid bruits or jugular venous distension.  No thyromegaly or lymphadenopathy is present.    Lungs clear.  No wheezing.  Breath sounds are normal bilaterally.    Heart normal first and second heart sounds.  No murmur..  No pericardial rub is present.  No gallop is present.    Abdomen soft and nontender.  No organomegaly is present.    Extremities revealed good peripheral pulses without any pedal edema.    Skin warm and dry.    Musculoskeletal system is grossly normal.    CNS grossly normal.    Reviewed and updated.          "

## 2025-05-02 RX ORDER — DILTIAZEM HYDROCHLORIDE 240 MG/1
240 CAPSULE, COATED, EXTENDED RELEASE ORAL EVERY MORNING
Qty: 90 CAPSULE | Refills: 3 | Status: SHIPPED | OUTPATIENT
Start: 2025-05-02

## 2025-05-02 NOTE — TELEPHONE ENCOUNTER
Rx Refill Note  Requested Prescriptions     Pending Prescriptions Disp Refills    dilTIAZem CD (CARDIZEM CD) 240 MG 24 hr capsule [Pharmacy Med Name: DILTIAZEM CD 240MG CAPSULES (24 HR)] 90 capsule 3     Sig: TAKE 1 CAPSULE BY MOUTH EVERY MORNING      Last office visit with prescribing clinician: 8/28/2024   Last telemedicine visit with prescribing clinician: Visit date not found   Next office visit with prescribing clinician: 5/7/2025                         Would you like a call back once the refill request has been completed: [] Yes [] No    If the office needs to give you a call back, can they leave a voicemail: [] Yes [] No    Lisa Morgan MA  05/02/25, 08:06 EDT

## 2025-05-07 ENCOUNTER — OFFICE VISIT (OUTPATIENT)
Dept: CARDIOLOGY | Facility: CLINIC | Age: 70
End: 2025-05-07
Payer: MEDICARE

## 2025-05-07 VITALS
HEIGHT: 64 IN | OXYGEN SATURATION: 100 % | BODY MASS INDEX: 32.84 KG/M2 | SYSTOLIC BLOOD PRESSURE: 147 MMHG | DIASTOLIC BLOOD PRESSURE: 79 MMHG | HEART RATE: 52 BPM

## 2025-05-07 DIAGNOSIS — E83.42 HYPOMAGNESEMIA: ICD-10-CM

## 2025-05-07 DIAGNOSIS — E87.6 HYPOKALEMIA: ICD-10-CM

## 2025-05-07 DIAGNOSIS — I10 ESSENTIAL HYPERTENSION: ICD-10-CM

## 2025-05-07 DIAGNOSIS — R01.1 HEART MURMUR: ICD-10-CM

## 2025-05-07 DIAGNOSIS — R53.83 TIREDNESS: ICD-10-CM

## 2025-05-07 DIAGNOSIS — I48.0 PAROXYSMAL ATRIAL FIBRILLATION: Primary | ICD-10-CM

## 2025-05-07 DIAGNOSIS — J81.0 ACUTE PULMONARY EDEMA: ICD-10-CM

## 2025-05-07 RX ORDER — SELADELPAR LYSINE 10 MG/1
10 CAPSULE ORAL DAILY
COMMUNITY
Start: 2025-04-28

## 2025-05-30 ENCOUNTER — LAB (OUTPATIENT)
Dept: FAMILY MEDICINE CLINIC | Facility: CLINIC | Age: 70
End: 2025-05-30
Payer: MEDICARE

## 2025-05-30 ENCOUNTER — OFFICE VISIT (OUTPATIENT)
Dept: FAMILY MEDICINE CLINIC | Facility: CLINIC | Age: 70
End: 2025-05-30
Payer: MEDICARE

## 2025-05-30 VITALS
DIASTOLIC BLOOD PRESSURE: 78 MMHG | TEMPERATURE: 98 F | OXYGEN SATURATION: 98 % | BODY MASS INDEX: 31.47 KG/M2 | HEART RATE: 50 BPM | SYSTOLIC BLOOD PRESSURE: 123 MMHG | HEIGHT: 64 IN | WEIGHT: 184.3 LBS | RESPIRATION RATE: 16 BRPM

## 2025-05-30 DIAGNOSIS — E55.9 VITAMIN D DEFICIENCY: ICD-10-CM

## 2025-05-30 DIAGNOSIS — R53.83 OTHER FATIGUE: ICD-10-CM

## 2025-05-30 DIAGNOSIS — M62.830 MUSCLE SPASM OF BACK: Primary | ICD-10-CM

## 2025-05-30 DIAGNOSIS — Z12.31 VISIT FOR SCREENING MAMMOGRAM: ICD-10-CM

## 2025-05-30 DIAGNOSIS — E11.9 TYPE 2 DIABETES MELLITUS WITHOUT COMPLICATION, WITHOUT LONG-TERM CURRENT USE OF INSULIN: ICD-10-CM

## 2025-05-30 PROBLEM — I10 HYPERTENSION: Status: RESOLVED | Noted: 2019-07-16 | Resolved: 2025-05-30

## 2025-05-30 PROBLEM — R94.31 ABNORMAL EKG: Status: RESOLVED | Noted: 2023-07-17 | Resolved: 2025-05-30

## 2025-05-30 PROBLEM — R10.9 ACUTE RIGHT FLANK PAIN: Status: RESOLVED | Noted: 2022-12-22 | Resolved: 2025-05-30

## 2025-05-30 PROBLEM — R19.7 DIARRHEA: Status: RESOLVED | Noted: 2023-09-27 | Resolved: 2025-05-30

## 2025-05-30 LAB
25(OH)D3 SERPL-MCNC: 31.4 NG/ML (ref 30–100)
ALBUMIN SERPL-MCNC: 3.7 G/DL (ref 3.5–5.2)
ALBUMIN/GLOB SERPL: 1.2 G/DL
ALP SERPL-CCNC: 330 U/L (ref 39–117)
ALT SERPL W P-5'-P-CCNC: 46 U/L (ref 1–33)
ANION GAP SERPL CALCULATED.3IONS-SCNC: 11.2 MMOL/L (ref 5–15)
AST SERPL-CCNC: 69 U/L (ref 1–32)
BASOPHILS # BLD AUTO: 0.11 10*3/MM3 (ref 0–0.2)
BASOPHILS NFR BLD AUTO: 1.1 % (ref 0–1.5)
BILIRUB SERPL-MCNC: 0.6 MG/DL (ref 0–1.2)
BUN SERPL-MCNC: 13 MG/DL (ref 8–23)
BUN/CREAT SERPL: 16.9 (ref 7–25)
CALCIUM SPEC-SCNC: 9.3 MG/DL (ref 8.6–10.5)
CHLORIDE SERPL-SCNC: 103 MMOL/L (ref 98–107)
CO2 SERPL-SCNC: 27.8 MMOL/L (ref 22–29)
CREAT SERPL-MCNC: 0.77 MG/DL (ref 0.57–1)
DEPRECATED RDW RBC AUTO: 39.7 FL (ref 37–54)
EGFRCR SERPLBLD CKD-EPI 2021: 84.7 ML/MIN/1.73
EOSINOPHIL # BLD AUTO: 0.24 10*3/MM3 (ref 0–0.4)
EOSINOPHIL NFR BLD AUTO: 2.3 % (ref 0.3–6.2)
ERYTHROCYTE [DISTWIDTH] IN BLOOD BY AUTOMATED COUNT: 12.2 % (ref 12.3–15.4)
GLOBULIN UR ELPH-MCNC: 3 GM/DL
GLUCOSE SERPL-MCNC: 135 MG/DL (ref 65–99)
HBA1C MFR BLD: 5.1 % (ref 4.8–5.6)
HCT VFR BLD AUTO: 40.9 % (ref 34–46.6)
HGB BLD-MCNC: 14 G/DL (ref 12–15.9)
IMM GRANULOCYTES # BLD AUTO: 0.05 10*3/MM3 (ref 0–0.05)
IMM GRANULOCYTES NFR BLD AUTO: 0.5 % (ref 0–0.5)
LYMPHOCYTES # BLD AUTO: 1.61 10*3/MM3 (ref 0.7–3.1)
LYMPHOCYTES NFR BLD AUTO: 15.7 % (ref 19.6–45.3)
MCH RBC QN AUTO: 31 PG (ref 26.6–33)
MCHC RBC AUTO-ENTMCNC: 34.2 G/DL (ref 31.5–35.7)
MCV RBC AUTO: 90.7 FL (ref 79–97)
MONOCYTES # BLD AUTO: 1.18 10*3/MM3 (ref 0.1–0.9)
MONOCYTES NFR BLD AUTO: 11.5 % (ref 5–12)
NEUTROPHILS NFR BLD AUTO: 68.9 % (ref 42.7–76)
NEUTROPHILS NFR BLD AUTO: 7.04 10*3/MM3 (ref 1.7–7)
PLATELET # BLD AUTO: 409 10*3/MM3 (ref 140–450)
PMV BLD AUTO: 13.8 FL (ref 6–12)
POTASSIUM SERPL-SCNC: 3.7 MMOL/L (ref 3.5–5.2)
PROT SERPL-MCNC: 6.7 G/DL (ref 6–8.5)
RBC # BLD AUTO: 4.51 10*6/MM3 (ref 3.77–5.28)
SODIUM SERPL-SCNC: 142 MMOL/L (ref 136–145)
VIT B12 BLD-MCNC: 366 PG/ML (ref 211–946)
WBC NRBC COR # BLD AUTO: 10.23 10*3/MM3 (ref 3.4–10.8)

## 2025-05-30 PROCEDURE — 82306 VITAMIN D 25 HYDROXY: CPT | Performed by: FAMILY MEDICINE

## 2025-05-30 PROCEDURE — 83036 HEMOGLOBIN GLYCOSYLATED A1C: CPT | Performed by: FAMILY MEDICINE

## 2025-05-30 PROCEDURE — 36415 COLL VENOUS BLD VENIPUNCTURE: CPT | Performed by: FAMILY MEDICINE

## 2025-05-30 PROCEDURE — 84439 ASSAY OF FREE THYROXINE: CPT | Performed by: FAMILY MEDICINE

## 2025-05-30 PROCEDURE — 85025 COMPLETE CBC W/AUTO DIFF WBC: CPT | Performed by: FAMILY MEDICINE

## 2025-05-30 PROCEDURE — 80053 COMPREHEN METABOLIC PANEL: CPT | Performed by: FAMILY MEDICINE

## 2025-05-30 PROCEDURE — 82607 VITAMIN B-12: CPT | Performed by: FAMILY MEDICINE

## 2025-05-30 PROCEDURE — 84443 ASSAY THYROID STIM HORMONE: CPT | Performed by: FAMILY MEDICINE

## 2025-05-30 RX ORDER — METHYLPREDNISOLONE 4 MG/1
TABLET ORAL
Qty: 1 EACH | Refills: 0 | Status: SHIPPED | OUTPATIENT
Start: 2025-05-30

## 2025-05-30 RX ORDER — CYCLOBENZAPRINE HCL 10 MG
10 TABLET ORAL NIGHTLY PRN
Qty: 20 TABLET | Refills: 0 | Status: SHIPPED | OUTPATIENT
Start: 2025-05-30

## 2025-05-30 RX ORDER — TIRZEPATIDE 7.5 MG/.5ML
7.5 INJECTION, SOLUTION SUBCUTANEOUS WEEKLY
Qty: 2 ML | Refills: 2 | Status: SHIPPED | OUTPATIENT
Start: 2025-05-30

## 2025-05-30 NOTE — PROGRESS NOTES
Subjective   Chief Complaint   Patient presents with    Back Pain     X3 weeks Pulled muscle    Follow-up    Med Refill    Fatigue    Diabetes     Ibeth Farris is a 69 y.o. female.     Patient Care Team:  Chelo Saavedra MD as PCP - General  Kwadwo Thibodeaux MD as Consulting Physician (Cardiology)  Cornelio Ibrahim MD as Consulting Physician (Gastroenterology)  Damien Nguyen APRN as Nurse Practitioner (Breast Surgery)  Paula Saab MD as Consulting Physician (Hematology and Oncology)  Chidi Arizmendi MD as Consulting Physician (Breast Surgery)  Chidi Arizmendi MD as Consulting Physician (Breast Surgery)    History of Present Illness  She is coming in today due to lower back pain/back spasm which she has been experiencing over the last 3 weeks or so.  Patient reports that 3 weeks ago she was folding bedsheets and while she was pulling on the sheets she felt sudden pain in the lower back.  The pain has been moving from 1 side to the other and also went up to her back.  It feels like muscle spasms.  She had some Skelaxin at home and tried to take that, but that did not help.  She reports that she went to see the chiropractor who did a lot of x-rays and she was advised that everything looked fine.  Her pain is affecting her daily functioning.  She also reports having some issues with fatigue and tiredness over the last couple of months since the spring started.  Patient is already followed by the GI for the management of her primary biliary cholangitis.  She is being treated for diabetes and has been on Mounjaro for almost a year now or so, she reports doing very well with Mounjaro, no side effects are being reported and she wants to continue the medication.       The following portions of the patient's history were reviewed and updated as appropriate: allergies, current medications, past family history, past medical history, past social history, past surgical history, and problem list.  Past  Medical History:   Diagnosis Date    Allergic     Ankle fracture 03/2020    right    Anxiety     Atrial fibrillation     Cancer     skin can- right elbow     Congenital heart failure     Patient states she does not have this    Diabetes mellitus     GERD (gastroesophageal reflux disease)     Hot flashes     Hypertension     Injury of back     Myocardial infarct 2018    Primary biliary cirrhosis 2013    Sleep apnea     CPAP- to bring dos     Past Surgical History:   Procedure Laterality Date    ANKLE OPEN REDUCTION INTERNAL FIXATION Right 03/26/2020    Procedure: ANKLE OPEN REDUCTION INTERNAL FIXATION lateral malleolus with syndesmotic fixation;  Surgeon: Kaleb Contreras MD;  Location: [facility] MAIN OR;  Service: Orthopedics;  Laterality: Right;    ANKLE OPEN REDUCTION INTERNAL FIXATION Left 04/20/2023    Procedure: ANKLE OPEN REDUCTION INTERNAL FIXATION;  Surgeon: Juvenal Mcnulty MD;  Location: [facility] MAIN OR;  Service: Orthopedics;  Laterality: Left;    AV NODE ABLATION  09/14/2018    BREAST BIOPSY Right 2024    CATARACT EXTRACTION, BILATERAL Bilateral 04/2025    COLONOSCOPY      CYSTOSCOPY, URETEROSCOPY, RETROGRADE PYELOGRAM, STENT INSERTION Right 11/01/2021    Procedure: CYSTOSCOPY, right  URETEROSCOPY, right RETROGRADE PYELOGRAM, balloon dilation of right ureteral stricture, right ureteral stent placement STENT INSERTION;  Surgeon: Chuck Lewis MD;  Location: [facility] OR;  Service: Urology;  Laterality: Right;    ENDOSCOPY      FRACTURE SURGERY      HARDWARE REMOVAL Right 07/28/2020    Procedure: RIGHT ANKLE HARDWARE REMOVAL;  Surgeon: Kaleb Contreras MD;  Location: [facility] OR;  Service: Orthopedics;  Laterality: Right;    LIVER BIOPSY      SKIN BIOPSY      SKIN CANCER EXCISION  2000    melanoma - right arm - Dr. Mcdaniel    TIBIA IM NAILING/RODDING Left 12/06/2023    Procedure: TIBIA INTRAMEDULLARY NAIL/TOBIN INSERTION;  Surgeon: Luigi Alvarado MD;  Location: [facility] MAIN OR;  Service: Orthopedics;  " Laterality: Left;    UPPER ENDOSCOPIC ULTRASOUND W/ FNA N/A 02/15/2022    Procedure: EUS GUIDED LIVER BX;  Surgeon: Sarina Jarquin MD;  Location: [facility] ENDOSCOPY;  Service: Gastroenterology;  Laterality: N/A;  esophagitis, hiatal hernia     The patient has a family history of  Family History   Problem Relation Age of Onset    Multiple myeloma Mother     Heart disease Mother      Social History     Socioeconomic History    Marital status:    Tobacco Use    Smoking status: Never     Passive exposure: Never    Smokeless tobacco: Never   Vaping Use    Vaping status: Never Used   Substance and Sexual Activity    Alcohol use: Not Currently     Alcohol/week: 2.0 standard drinks of alcohol     Types: 2 Glasses of wine per week     Comment: s0cial    Drug use: Never    Sexual activity: Not Currently       Review of Systems   Constitutional:  Negative for activity change, fatigue and fever.   Respiratory:  Negative for shortness of breath and wheezing.    Cardiovascular:  Negative for chest pain, palpitations and leg swelling.   Endocrine: Negative for polydipsia and polyphagia.   Musculoskeletal:  Positive for back pain and gait problem. Negative for arthralgias.   Skin:  Negative for rash.   Neurological:  Negative for tremors and headache.     Visit Vitals  /78 (BP Location: Left arm, Patient Position: Sitting, Cuff Size: Adult)   Pulse 50   Temp 98 °F (36.7 °C) (Infrared)   Resp 16   Ht 162.6 cm (64.02\")   Wt 83.6 kg (184 lb 4.8 oz)   SpO2 98%   BMI 31.62 kg/m²              Current Outpatient Medications:     Accu-Chek FastClix Lancets misc, USE AS DIRECTED TO TEST BLOOD SUGAR ONCE DAILY, Disp: 102 each, Rfl: 0    apixaban (ELIQUIS) 5 MG tablet tablet, Take 1 tablet by mouth Every 12 (Twelve) Hours., Disp: 180 tablet, Rfl: 4    atorvastatin (LIPITOR) 20 MG tablet, Take 1 tablet by mouth Daily, Disp: 90 tablet, Rfl: 0    Blood Glucose Monitoring Suppl (ACCU-CHEK KRYSTEN) device, Use as instructed to check " blood sugar, Disp: 1 each, Rfl: 0    dilTIAZem CD (CARDIZEM CD) 240 MG 24 hr capsule, TAKE 1 CAPSULE BY MOUTH EVERY MORNING, Disp: 90 capsule, Rfl: 3    escitalopram (LEXAPRO) 20 MG tablet, TAKE 1 TABLET BY MOUTH DAILY, Disp: 90 tablet, Rfl: 1    furosemide (LASIX) 20 MG tablet, Take 1 tablet by mouth Daily., Disp: 90 tablet, Rfl: 3    glucose blood (Accu-Chek Lakeisha) test strip, Use as instructed to check blood sugar once a day, Disp: 100 each, Rfl: 1    Livdelzi 10 MG capsule, Take 10 mg by mouth Daily., Disp: , Rfl:     metoprolol succinate XL (TOPROL-XL) 200 MG 24 hr tablet, TAKE 1 TABLET BY MOUTH EVERY DAY, Disp: 100 tablet, Rfl: 3    Mounjaro 7.5 MG/0.5ML solution auto-injector, Inject 7.5 mg under the skin into the appropriate area as directed 1 (One) Time Per Week., Disp: 2 mL, Rfl: 2    ondansetron ODT (ZOFRAN-ODT) 8 MG disintegrating tablet, Place 1 tablet on the tongue Every 8 (Eight) Hours As Needed for Nausea or Vomiting., Disp: 10 tablet, Rfl: 0    pantoprazole (Protonix) 20 MG EC tablet, Take 1 tablet by mouth Daily. Take day of surgery, Disp: 90 tablet, Rfl: 1    Vibegron (Gemtesa) 75 MG tablet, Take 1 tablet by mouth Daily., Disp: 30 tablet, Rfl: 2    cyclobenzaprine (FLEXERIL) 10 MG tablet, Take 1 tablet by mouth At Night As Needed for Muscle Spasms., Disp: 20 tablet, Rfl: 0    methylPREDNISolone (Medrol) 4 MG dose pack, Take as directed on package instructions., Disp: 1 each, Rfl: 0    Objective   Physical Exam  Constitutional:       General: She is not in acute distress.     Appearance: Normal appearance. She is well-developed. She is not ill-appearing or diaphoretic.      Comments: Patient is in no distress, patient has normal voice and speech.  Normal respiratory effort.  Her gait is somehow antalgic due to back pain.   HENT:      Head: Normocephalic and atraumatic.   Pulmonary:      Effort: Pulmonary effort is normal.   Musculoskeletal:      Cervical back: Normal range of motion and neck  supple.      Comments: Tenderness over the muscles of the lower and upper back.   Neurological:      General: No focal deficit present.      Mental Status: She is alert and oriented to person, place, and time. Mental status is at baseline.   Psychiatric:         Mood and Affect: Mood normal.         FOLLOWING LABS WERE REVIEWED TODAY:  CMP          8/21/2024    16:32 9/9/2024    13:18 1/24/2025    12:06   CMP   Glucose  107  93    BUN  23  12    Creatinine 0.60  0.70  0.75    EGFR 99.1  95.5  87.4    Sodium  141  143    Potassium  3.7  4.1    Chloride  103  101    Calcium  9.6  10.0    Total Protein  7.0  7.1    Albumin  4.2  4.1    Globulin  2.8  3.0    Total Bilirubin  0.5  0.7    Alkaline Phosphatase  449  563    AST (SGOT)  39  40    ALT (SGPT)  33  29    Albumin/Globulin Ratio  1.5  1.4    BUN/Creatinine Ratio  32.9  16.0    Anion Gap  10.0  15.2      Most Recent A1C          1/24/2025    12:06   HGBA1C Most Recent   Hemoglobin A1C 5.20           Assessment & Plan   Diagnoses and all orders for this visit:    1. Muscle spasm of back (Primary)  -     cyclobenzaprine (FLEXERIL) 10 MG tablet; Take 1 tablet by mouth At Night As Needed for Muscle Spasms.  Dispense: 20 tablet; Refill: 0  -     methylPREDNISolone (Medrol) 4 MG dose pack; Take as directed on package instructions.  Dispense: 1 each; Refill: 0    2. Type 2 diabetes mellitus without complication, without long-term current use of insulin  -     Comprehensive Metabolic Panel  -     Hemoglobin A1c  -     Mounjaro 7.5 MG/0.5ML solution auto-injector; Inject 7.5 mg under the skin into the appropriate area as directed 1 (One) Time Per Week.  Dispense: 2 mL; Refill: 2    3. Other fatigue  -     CBC Auto Differential  -     Vitamin B12    4. Vitamin D deficiency  -     Vitamin D,25-Hydroxy    5. Visit for screening mammogram  -     Mammo Screening Digital Tomosynthesis Bilateral With CAD; Future      I gave her prescription for some muscle relaxer and a steroid  pack for her ongoing muscle spasms.  I also addressed her fatigue and blood work will be done today.  Her diabetes has been well-controlled since she started Mounjaro.  She is due for some repeated blood work and this will be done today.  I also provided patient with a new order for screening mammogram and she will be scheduling this down the road.      Return in about 3 months (around 8/30/2025) for Next scheduled follow up.    Requested Prescriptions     Signed Prescriptions Disp Refills    cyclobenzaprine (FLEXERIL) 10 MG tablet 20 tablet 0     Sig: Take 1 tablet by mouth At Night As Needed for Muscle Spasms.    methylPREDNISolone (Medrol) 4 MG dose pack 1 each 0     Sig: Take as directed on package instructions.    Mounjaro 7.5 MG/0.5ML solution auto-injector 2 mL 2     Sig: Inject 7.5 mg under the skin into the appropriate area as directed 1 (One) Time Per Week.       Chelo Saavedra MD  05/30/2025  07:51 EDT

## 2025-05-31 LAB
T4 FREE SERPL-MCNC: 1.28 NG/DL (ref 0.92–1.68)
TSH SERPL DL<=0.05 MIU/L-ACNC: 4.33 UIU/ML (ref 0.27–4.2)

## 2025-07-25 DIAGNOSIS — K21.9 GASTROESOPHAGEAL REFLUX DISEASE WITHOUT ESOPHAGITIS: ICD-10-CM

## 2025-07-25 RX ORDER — PANTOPRAZOLE SODIUM 20 MG/1
20 TABLET, DELAYED RELEASE ORAL EVERY 24 HOURS
Qty: 90 TABLET | Refills: 0 | Status: SHIPPED | OUTPATIENT
Start: 2025-07-25

## 2025-07-31 DIAGNOSIS — F41.9 ANXIETY: ICD-10-CM

## 2025-07-31 RX ORDER — ESCITALOPRAM OXALATE 20 MG/1
20 TABLET ORAL DAILY
Qty: 90 TABLET | Refills: 0 | Status: SHIPPED | OUTPATIENT
Start: 2025-07-31

## 2025-08-05 ENCOUNTER — TELEPHONE (OUTPATIENT)
Dept: FAMILY MEDICINE CLINIC | Facility: CLINIC | Age: 70
End: 2025-08-05
Payer: MEDICARE

## 2025-08-06 DIAGNOSIS — E78.2 MIXED HYPERLIPIDEMIA: ICD-10-CM

## 2025-08-06 RX ORDER — ATORVASTATIN CALCIUM 20 MG/1
20 TABLET, FILM COATED ORAL DAILY
Qty: 90 TABLET | Refills: 0 | Status: SHIPPED | OUTPATIENT
Start: 2025-08-06

## 2025-08-25 DIAGNOSIS — E11.9 TYPE 2 DIABETES MELLITUS WITHOUT COMPLICATION, WITHOUT LONG-TERM CURRENT USE OF INSULIN: ICD-10-CM

## 2025-08-25 RX ORDER — TIRZEPATIDE 7.5 MG/.5ML
INJECTION, SOLUTION SUBCUTANEOUS
Qty: 2 ML | Refills: 0 | Status: SHIPPED | OUTPATIENT
Start: 2025-08-25 | End: 2025-08-29 | Stop reason: SDUPTHER

## 2025-08-27 ENCOUNTER — OFFICE (AMBULATORY)
Dept: URBAN - METROPOLITAN AREA CLINIC 64 | Facility: CLINIC | Age: 70
End: 2025-08-27
Payer: MEDICARE

## 2025-08-27 VITALS
HEIGHT: 67 IN | WEIGHT: 175 LBS | DIASTOLIC BLOOD PRESSURE: 89 MMHG | SYSTOLIC BLOOD PRESSURE: 128 MMHG | HEART RATE: 57 BPM

## 2025-08-27 DIAGNOSIS — K21.9 GASTRO-ESOPHAGEAL REFLUX DISEASE WITHOUT ESOPHAGITIS: ICD-10-CM

## 2025-08-27 DIAGNOSIS — K75.4 AUTOIMMUNE HEPATITIS: ICD-10-CM

## 2025-08-27 DIAGNOSIS — K74.3 PRIMARY BILIARY CIRRHOSIS: ICD-10-CM

## 2025-08-27 PROCEDURE — 99214 OFFICE O/P EST MOD 30 MIN: CPT | Performed by: INTERNAL MEDICINE

## 2025-08-27 RX ORDER — PANTOPRAZOLE SODIUM 40 MG/1
40 TABLET, DELAYED RELEASE ORAL
Qty: 90 | Refills: 3 | Status: ACTIVE
Start: 2025-08-27

## 2025-08-27 RX ORDER — AZATHIOPRINE 50 MG/1
50 TABLET ORAL
Qty: 30 | Refills: 11 | Status: ACTIVE
Start: 2025-08-27

## 2025-08-29 ENCOUNTER — LAB (OUTPATIENT)
Dept: FAMILY MEDICINE CLINIC | Facility: CLINIC | Age: 70
End: 2025-08-29
Payer: MEDICARE

## 2025-08-29 ENCOUNTER — OFFICE VISIT (OUTPATIENT)
Dept: FAMILY MEDICINE CLINIC | Facility: CLINIC | Age: 70
End: 2025-08-29
Payer: MEDICARE

## 2025-08-29 VITALS
BODY MASS INDEX: 29.88 KG/M2 | RESPIRATION RATE: 16 BRPM | HEIGHT: 64 IN | OXYGEN SATURATION: 95 % | DIASTOLIC BLOOD PRESSURE: 85 MMHG | HEART RATE: 50 BPM | WEIGHT: 175 LBS | SYSTOLIC BLOOD PRESSURE: 135 MMHG | TEMPERATURE: 98.2 F

## 2025-08-29 DIAGNOSIS — K21.9 GASTROESOPHAGEAL REFLUX DISEASE WITHOUT ESOPHAGITIS: ICD-10-CM

## 2025-08-29 DIAGNOSIS — E11.9 TYPE 2 DIABETES MELLITUS WITHOUT COMPLICATION, WITHOUT LONG-TERM CURRENT USE OF INSULIN: Primary | ICD-10-CM

## 2025-08-29 DIAGNOSIS — F41.9 ANXIETY: ICD-10-CM

## 2025-08-29 DIAGNOSIS — E11.9 TYPE 2 DIABETES MELLITUS WITHOUT COMPLICATION, WITHOUT LONG-TERM CURRENT USE OF INSULIN: ICD-10-CM

## 2025-08-29 DIAGNOSIS — E55.9 VITAMIN D DEFICIENCY: ICD-10-CM

## 2025-08-29 DIAGNOSIS — E53.8 VITAMIN B12 DEFICIENCY: ICD-10-CM

## 2025-08-29 DIAGNOSIS — E78.2 MIXED HYPERLIPIDEMIA: ICD-10-CM

## 2025-08-29 LAB
25(OH)D3 SERPL-MCNC: 30.6 NG/ML (ref 30–100)
ALBUMIN SERPL-MCNC: 4.1 G/DL (ref 3.5–5.2)
ALBUMIN/GLOB SERPL: 1.4 G/DL
ALP SERPL-CCNC: 374 U/L (ref 39–117)
ALT SERPL W P-5'-P-CCNC: 32 U/L (ref 1–33)
ANION GAP SERPL CALCULATED.3IONS-SCNC: 13 MMOL/L (ref 5–15)
AST SERPL-CCNC: 48 U/L (ref 1–32)
BILIRUB SERPL-MCNC: 0.6 MG/DL (ref 0–1.2)
BUN SERPL-MCNC: 14 MG/DL (ref 8–23)
BUN/CREAT SERPL: 25 (ref 7–25)
CALCIUM SPEC-SCNC: 9.9 MG/DL (ref 8.6–10.5)
CHLORIDE SERPL-SCNC: 103 MMOL/L (ref 98–107)
CHOLEST SERPL-MCNC: 184 MG/DL (ref 0–200)
CO2 SERPL-SCNC: 25 MMOL/L (ref 22–29)
CREAT SERPL-MCNC: 0.56 MG/DL (ref 0.57–1)
EGFRCR SERPLBLD CKD-EPI 2021: 100.2 ML/MIN/1.73
GLOBULIN UR ELPH-MCNC: 2.9 GM/DL
GLUCOSE SERPL-MCNC: 113 MG/DL (ref 65–99)
HBA1C MFR BLD: 5.2 % (ref 4.8–5.6)
HDLC SERPL-MCNC: 89 MG/DL (ref 40–60)
LDLC SERPL CALC-MCNC: 83 MG/DL (ref 0–100)
LDLC/HDLC SERPL: 0.93 {RATIO}
POTASSIUM SERPL-SCNC: 3.6 MMOL/L (ref 3.5–5.2)
PROT SERPL-MCNC: 7 G/DL (ref 6–8.5)
SODIUM SERPL-SCNC: 141 MMOL/L (ref 136–145)
TRIGL SERPL-MCNC: 63 MG/DL (ref 0–150)
TSH SERPL DL<=0.05 MIU/L-ACNC: 1.03 UIU/ML (ref 0.27–4.2)
VIT B12 BLD-MCNC: 319 PG/ML (ref 211–946)
VLDLC SERPL-MCNC: 12 MG/DL (ref 5–40)

## 2025-08-29 PROCEDURE — 82306 VITAMIN D 25 HYDROXY: CPT | Performed by: FAMILY MEDICINE

## 2025-08-29 PROCEDURE — 84443 ASSAY THYROID STIM HORMONE: CPT | Performed by: FAMILY MEDICINE

## 2025-08-29 PROCEDURE — 80053 COMPREHEN METABOLIC PANEL: CPT | Performed by: FAMILY MEDICINE

## 2025-08-29 PROCEDURE — 36415 COLL VENOUS BLD VENIPUNCTURE: CPT | Performed by: FAMILY MEDICINE

## 2025-08-29 PROCEDURE — 83036 HEMOGLOBIN GLYCOSYLATED A1C: CPT | Performed by: FAMILY MEDICINE

## 2025-08-29 PROCEDURE — 82607 VITAMIN B-12: CPT | Performed by: FAMILY MEDICINE

## 2025-08-29 PROCEDURE — 80061 LIPID PANEL: CPT | Performed by: FAMILY MEDICINE

## 2025-08-29 RX ORDER — TIRZEPATIDE 7.5 MG/.5ML
7.5 INJECTION, SOLUTION SUBCUTANEOUS WEEKLY
Qty: 2 ML | Refills: 2 | Status: SHIPPED | OUTPATIENT
Start: 2025-08-29

## 2025-08-29 RX ORDER — URSODIOL 300 MG/1
300 CAPSULE ORAL 2 TIMES DAILY
COMMUNITY

## 2025-08-29 RX ORDER — ESCITALOPRAM OXALATE 20 MG/1
20 TABLET ORAL DAILY
Qty: 90 TABLET | Refills: 0 | Status: SHIPPED | OUTPATIENT
Start: 2025-08-29

## 2025-08-29 RX ORDER — BUDESONIDE 3 MG/1
9 CAPSULE, COATED PELLETS ORAL EVERY MORNING
COMMUNITY

## 2025-08-29 RX ORDER — PANTOPRAZOLE SODIUM 20 MG/1
20 TABLET, DELAYED RELEASE ORAL EVERY 24 HOURS
Qty: 90 TABLET | Refills: 0 | Status: SHIPPED | OUTPATIENT
Start: 2025-08-29